# Patient Record
Sex: FEMALE | Race: AMERICAN INDIAN OR ALASKA NATIVE | NOT HISPANIC OR LATINO | Employment: OTHER | URBAN - METROPOLITAN AREA
[De-identification: names, ages, dates, MRNs, and addresses within clinical notes are randomized per-mention and may not be internally consistent; named-entity substitution may affect disease eponyms.]

---

## 2017-12-05 ENCOUNTER — TRANSCRIBE ORDERS (OUTPATIENT)
Dept: ADMINISTRATIVE | Facility: HOSPITAL | Age: 57
End: 2017-12-05

## 2017-12-05 ENCOUNTER — GENERIC CONVERSION - ENCOUNTER (OUTPATIENT)
Dept: OTHER | Facility: OTHER | Age: 57
End: 2017-12-05

## 2017-12-05 ENCOUNTER — HOSPITAL ENCOUNTER (OUTPATIENT)
Dept: RADIOLOGY | Facility: HOSPITAL | Age: 57
Discharge: HOME | End: 2017-12-05
Payer: COMMERCIAL

## 2017-12-05 DIAGNOSIS — J40 BRONCHITIS: ICD-10-CM

## 2017-12-05 DIAGNOSIS — J40 BRONCHITIS: Primary | ICD-10-CM

## 2017-12-05 PROCEDURE — 71020 HB CHEST X-RAY 2VW FRONTAL&LATL: CPT

## 2018-05-10 ENCOUNTER — TRANSCRIBE ORDERS (OUTPATIENT)
Dept: ADMINISTRATIVE | Facility: HOSPITAL | Age: 58
End: 2018-05-10

## 2018-05-10 ENCOUNTER — HOSPITAL ENCOUNTER (OUTPATIENT)
Dept: RADIOLOGY | Facility: HOSPITAL | Age: 58
Discharge: HOME | End: 2018-05-10
Payer: COMMERCIAL

## 2018-05-10 DIAGNOSIS — M25.561 ACUTE PAIN OF RIGHT KNEE: Primary | ICD-10-CM

## 2018-05-10 DIAGNOSIS — M25.561 ACUTE PAIN OF RIGHT KNEE: ICD-10-CM

## 2018-05-10 PROCEDURE — 73564 X-RAY EXAM KNEE 4 OR MORE: CPT

## 2018-05-23 ENCOUNTER — EVALUATION (OUTPATIENT)
Dept: PHYSICAL THERAPY | Facility: CLINIC | Age: 58
End: 2018-05-23
Payer: COMMERCIAL

## 2018-05-23 ENCOUNTER — TRANSCRIBE ORDERS (OUTPATIENT)
Dept: PHYSICAL THERAPY | Facility: CLINIC | Age: 58
End: 2018-05-23

## 2018-05-23 DIAGNOSIS — G89.29 CHRONIC PAIN OF RIGHT KNEE: Primary | ICD-10-CM

## 2018-05-23 DIAGNOSIS — M25.561 PAIN, JOINT, LOWER LEG, RIGHT: Primary | ICD-10-CM

## 2018-05-23 DIAGNOSIS — M25.561 CHRONIC PAIN OF RIGHT KNEE: Primary | ICD-10-CM

## 2018-05-23 PROCEDURE — G8979 MOBILITY GOAL STATUS: HCPCS

## 2018-05-23 PROCEDURE — 97161 PT EVAL LOW COMPLEX 20 MIN: CPT

## 2018-05-23 PROCEDURE — G8978 MOBILITY CURRENT STATUS: HCPCS

## 2018-05-23 PROCEDURE — 97140 MANUAL THERAPY 1/> REGIONS: CPT

## 2018-05-23 RX ORDER — LEVOTHYROXINE AND LIOTHYRONINE 76; 18 UG/1; UG/1
120 TABLET ORAL DAILY
COMMUNITY
End: 2021-01-08 | Stop reason: ALTCHOICE

## 2018-05-23 RX ORDER — LISINOPRIL 5 MG/1
7.5 TABLET ORAL DAILY
COMMUNITY
End: 2020-12-22 | Stop reason: SDUPTHER

## 2018-05-23 RX ORDER — FLUTICASONE FUROATE AND VILANTEROL 100; 25 UG/1; UG/1
POWDER RESPIRATORY (INHALATION)
COMMUNITY
End: 2021-04-05 | Stop reason: ALTCHOICE

## 2018-05-23 NOTE — LETTER
May 23, 2018    [facility]   [address] [place] [place] [address]    Patient: Yoel Raines   YOB: 1960   Date of Visit: 2018     Encounter Diagnosis     ICD-10-CM    1  Chronic pain of right knee M25 561     G89 29        Dear Dr Ayaka Caceres:    Please review the attached Plan of Care from [facility]'s recent visit  Please verify that you agree therapy should continue by signing the attached document and sending it back to our office  If you have any questions or concerns, please don't hesitate to call  Sincerely,    Murlean Cockayne, PT      Referring Provider:      I certify that I have read the below Plan of Care and certify the need for these services furnished under this plan of treatment while under my care  [facility] [address] [place] [address]  VIA Facsimile: [phone]          PT Evaluation     Today's date: 2018  Patient name: Coreen Barahona  : 1960  MRN: [id number]  Referring provider: Joleen North DO  Dx:   Encounter Diagnosis     ICD-10-CM    1  Chronic pain of right knee M25 561     G89 29                   Assessment  Impairments: activity intolerance, impaired physical strength, lacks appropriate home exercise program and pain with function  Functional limitations: difficulty going up/down stairs  Assessment details: Pt is a 61year old female who presents to PT with chronic R knee pain with associated OA  She presents with localized tenderness to medial joint line and PF joint  She has exceptional strength in majority of hip muscualture with exception of  Hip add/abd and ER's  Her LE muscle weakness is apparent during her gait cycle with clear trendelenberg on both sides and outturning of both feet during stride  She has normal knee ROM and LE flexibility   She will benefit from skilled PT to help reduce her pain symptoms, improve her LE strength, improve her gait, and improve her daily functioning to help her return to her previous recreational activities  Thank you kindly for your referral    Understanding of Dx/Px/POC: excellent   Prognosis: good    Goals  STG (3-4 weeks)  1: Reduce pain 2-3 grades on VAS  2: Increase LE strength 1/2 grade  3: improve gait by 25%    LTG (4-6 weeks)  1: pt able to ascend/descend stairs comfortably  2: Improve FOTO 15-20 pts  3: Pt independent in HEP    Plan  Patient would benefit from: skilled PT  Planned modality interventions: ultrasound and cryotherapy  Planned therapy interventions: manual therapy, massage, therapeutic exercise, therapeutic activities, strengthening, balance and home exercise program  Frequency: 2x week  Duration in visits: 12  Duration in weeks: 6  Treatment plan discussed with: patient  Plan details: Initiate PT as per POC        Subjective Evaluation    History of Present Illness  Mechanism of injury: Knee pain started last year- was sitting in hospital rooms while parents were sick  L knee also gives her trouble but not as much as R knee  Wants to get more fit  Injured R knee skiing back in late s- bad sprain  Does sheron chi everyday  Going to try to do exercise sin [employer] pool along with home exercises  Going to nutritionist  Recurrent probem    Quality of life: good    Pain  Current pain ratin  At best pain ratin  At worst pain ratin  Location: medial joint line of R knee  Quality: dull ache and sharp  Relieving factors: medications  Aggravating factors: standing, walking and stair climbing  Progression: worsening    Social Support  Steps to enter house: yes  1  Interior stairs assessed: basement     Lives in: one-story house  Lives with: spouse    Employment status: not working  Exercise history: previously biked, walked, sheron chi      Diagnostic Tests  X-ray: abnormal (bone spurs along PF joint)  Patient Goals  Patient goals for therapy: increased strength, decreased pain, increased motion, return to sport/leisure activities and independence with ADLs/IADLs  Patient goal: want ot get back to being active        Objective     Observations     Additional Observation Details  Gait: trendelenberg BL  Feet outturned, hip ER, mild posterior lean  Tenderness to medial joint line  Mild pain with patellofemoral joint play        Strength/Myotome Testing     Left Hip   Planes of Motion   Flexion: 4+  Abduction: 4-  Adduction: 4-  External rotation: 4  Internal rotation: 4+    Right Hip   Planes of Motion   Flexion: 4+  Abduction: 4  Adduction: 4  External rotation: 4  Internal rotation: 4+    Left Knee   Flexion: 4+  Extension: 5    Right Knee   Flexion: 4+  Extension: 5          Precautions: HTN, hypothyroidism, mild asthma    Daily Treatment Diary     Manual              STM                                                                     Exercise Diary              Recumbent bike             Standing hip ext, abd, flex TB             Heel/toe raises             SL clamshells             Hip add ball squeeze             bridges                                                                                                                                                                                                       Modalities              US to medial joint line             CP PRN                            HEP: SLR 4-way, clamshell, heel/toe raises

## 2018-05-23 NOTE — PROGRESS NOTES
PT Evaluation     Today's date: 2018  Patient name: Sabina Lee  : 1960  MRN: [id number]  Referring provider: Nova Self DO  Dx:   Encounter Diagnosis     ICD-10-CM    1  Chronic pain of right knee M25 561     G89 29                   Assessment  Impairments: activity intolerance, impaired physical strength, lacks appropriate home exercise program and pain with function  Functional limitations: difficulty going up/down stairs  Assessment details: Pt is a 61year old female who presents to PT with chronic R knee pain with associated OA  She presents with localized tenderness to medial joint line and PF joint  She has exceptional strength in majority of hip muscualture with exception of  Hip add/abd and ER's  Her LE muscle weakness is apparent during her gait cycle with clear trendelenberg on both sides and outturning of both feet during stride  She has normal knee ROM and LE flexibility  She will benefit from skilled PT to help reduce her pain symptoms, improve her LE strength, improve her gait, and improve her daily functioning to help her return to her previous recreational activities   Thank you kindly for your referral    Understanding of Dx/Px/POC: excellent   Prognosis: good    Goals  STG (3-4 weeks)  1: Reduce pain 2-3 grades on VAS  2: Increase LE strength 1/2 grade  3: improve gait by 25%    LTG (4-6 weeks)  1: pt able to ascend/descend stairs comfortably  2: Improve FOTO 15-20 pts  3: Pt independent in HEP    Plan  Patient would benefit from: skilled PT  Planned modality interventions: ultrasound and cryotherapy  Planned therapy interventions: manual therapy, massage, therapeutic exercise, therapeutic activities, strengthening, balance and home exercise program  Frequency: 2x week  Duration in visits: 12  Duration in weeks: 6  Treatment plan discussed with: patient  Plan details: Initiate PT as per POC        Subjective Evaluation    History of Present Illness  Mechanism of injury: Knee pain started last year- was sitting in hospital rooms while parents were sick  L knee also gives her trouble but not as much as R knee  Wants to get more fit  Injured R knee skiing back in late s- bad sprain  Does sheron chi everyday  Going to try to do exercise sin [employer] pool along with home exercises  Going to nutritionist  Recurrent probem    Quality of life: good    Pain  Current pain ratin  At best pain ratin  At worst pain ratin  Location: medial joint line of R knee  Quality: dull ache and sharp  Relieving factors: medications  Aggravating factors: standing, walking and stair climbing  Progression: worsening    Social Support  Steps to enter house: yes  1  Interior stairs assessed: basement     Lives in: [place] house  Lives with: spouse    Employment status: not working  Exercise history: previously biked, walked, sheron chi      Diagnostic Tests  X-ray: abnormal (bone spurs along PF joint)  Patient Goals  Patient goals for therapy: increased strength, decreased pain, increased motion, return to sport/leisure activities and independence with ADLs/IADLs  Patient goal: want ot get back to being active        Objective     Observations     Additional Observation Details  Gait: trendelenberg BL  Feet outturned, hip ER, mild posterior lean  Tenderness to medial joint line  Mild pain with patellofemoral joint play        Strength/Myotome Testing     Left Hip   Planes of Motion   Flexion: 4+  Abduction: 4-  Adduction: 4-  External rotation: 4  Internal rotation: 4+    Right Hip   Planes of Motion   Flexion: 4+  Abduction: 4  Adduction: 4  External rotation: 4  Internal rotation: 4+    Left Knee   Flexion: 4+  Extension: 5    Right Knee   Flexion: 4+  Extension: 5          Precautions: HTN, hypothyroidism, mild asthma    Daily Treatment Diary     Manual              STM                                                                     Exercise Diary              Recumbent bike Standing hip ext, abd, flex TB             Heel/toe raises             SL clamshells             Hip add ball squeeze             bridges                                                                                                                                                                                                       Modalities              US to medial joint line             CP PRN                            HEP: SLR 4-way, clamshell, heel/toe raises

## 2018-05-30 ENCOUNTER — OFFICE VISIT (OUTPATIENT)
Dept: PHYSICAL THERAPY | Facility: CLINIC | Age: 58
End: 2018-05-30
Payer: COMMERCIAL

## 2018-05-30 DIAGNOSIS — M25.561 CHRONIC PAIN OF RIGHT KNEE: Primary | ICD-10-CM

## 2018-05-30 DIAGNOSIS — G89.29 CHRONIC PAIN OF RIGHT KNEE: Primary | ICD-10-CM

## 2018-05-30 PROCEDURE — 97035 APP MDLTY 1+ULTRASOUND EA 15: CPT

## 2018-05-30 PROCEDURE — 97112 NEUROMUSCULAR REEDUCATION: CPT

## 2018-05-30 PROCEDURE — 97110 THERAPEUTIC EXERCISES: CPT

## 2018-05-30 NOTE — PROGRESS NOTES
Daily Note     Today's date: 2018  Patient name: Kizzy Gallo  : 1960  MRN: [id number]  Referring provider: Flora Ward DO  Dx:   Encounter Diagnosis     ICD-10-CM    1  Chronic pain of right knee M25 561     G89 29                   Subjective: Feeling okay, Exercises look a lot easier on the paper, but they are working out well  Objective: See treatment diary below      Assessment: Tolerated treatment well  Patient exhibited good technique with therapeutic exercises and would benefit from continued PT  Pt had a mild pain in both patellas during SAQ exercise but was able to complete  Pt reported no pain at the end of session, held CP  Continue to add more leg strengthening NV  Plan: Continue per plan of care       Precautions: HTN, hypothyroidism, mild asthma    Daily Treatment Diary     Manual              STM 5                                                                    Exercise Diary              Recumbent bike 5 min            Standing hip ext, abd, flex TB 20x GTB/OTB            Heel/toe raises 30x            SL clamshells GTB 20x5" each            Hip add ball squeeze 10x10"            bridges 15x5"            SAQ 5# 2x10                                                                                                                                                                         15/10                Modalities              US to medial joint line 8            CP PRN NP                           HEP: SLR 4-way, clamshell, heel/toe raises

## 2018-06-04 ENCOUNTER — OFFICE VISIT (OUTPATIENT)
Dept: PHYSICAL THERAPY | Facility: CLINIC | Age: 58
End: 2018-06-04
Payer: COMMERCIAL

## 2018-06-04 DIAGNOSIS — M25.561 CHRONIC PAIN OF RIGHT KNEE: Primary | ICD-10-CM

## 2018-06-04 DIAGNOSIS — G89.29 CHRONIC PAIN OF RIGHT KNEE: Primary | ICD-10-CM

## 2018-06-04 PROCEDURE — 97110 THERAPEUTIC EXERCISES: CPT

## 2018-06-04 PROCEDURE — 97112 NEUROMUSCULAR REEDUCATION: CPT

## 2018-06-04 PROCEDURE — 97140 MANUAL THERAPY 1/> REGIONS: CPT

## 2018-06-04 PROCEDURE — 97035 APP MDLTY 1+ULTRASOUND EA 15: CPT

## 2018-06-04 NOTE — PROGRESS NOTES
Daily Note     Today's date: 2018  Patient name: Maday Correia  : 1960  MRN: [id number]  Referring provider: Deann Hope DO  Dx:   Encounter Diagnosis     ICD-10-CM    1  Chronic pain of right knee M25 561     G89 29                   Subjective: R knee is feeling okay today pain 1-2/10 currently  Felt good after last session was just tired  I can tell the exercises are getting me stronger  Objective: See treatment diary below      Assessment: Tolerated treatment well  Patient exhibited good technique with therapeutic exercises and would benefit from continued PT  Pt had some fatigue after standing TB exercises  Pt requires some endurance training, mild SOB symptoms after strenuous exercises  No complaints of pain after session  Plan: Continue per plan of care       Precautions: HTN, hypothyroidism, mild asthma    Daily Treatment Diary     Manual             STM 5            Knee flex stretch/gastrocs stretch  10                                       10               Exercise Diary             Recumbent bike 5 min 5 min           Standing hip ext, abd, flex TB 20x GTB/OTB 20x GTB/OTB           Heel/toe raises 30x 30x           SL clamshells GTB 20x5" each GTB 20x5"           Hip add ball squeeze 10x10" 10x10"           bridges 15x5" 15x5"           SAQ 5# 2x10 5# 2x10           Lateral step-up L2  15x each                                                                                                                                                           15/10 15/10               Modalities             US to medial joint line 8 8           CP PRN NP                           HEP: SLR 4-way, clamshell, heel/toe raises

## 2018-06-07 ENCOUNTER — OFFICE VISIT (OUTPATIENT)
Dept: PHYSICAL THERAPY | Facility: CLINIC | Age: 58
End: 2018-06-07
Payer: COMMERCIAL

## 2018-06-07 DIAGNOSIS — G89.29 CHRONIC PAIN OF RIGHT KNEE: Primary | ICD-10-CM

## 2018-06-07 DIAGNOSIS — M25.561 CHRONIC PAIN OF RIGHT KNEE: Primary | ICD-10-CM

## 2018-06-07 PROCEDURE — 97035 APP MDLTY 1+ULTRASOUND EA 15: CPT

## 2018-06-07 PROCEDURE — 97140 MANUAL THERAPY 1/> REGIONS: CPT

## 2018-06-07 PROCEDURE — 97110 THERAPEUTIC EXERCISES: CPT

## 2018-06-07 PROCEDURE — 97112 NEUROMUSCULAR REEDUCATION: CPT

## 2018-06-07 NOTE — PROGRESS NOTES
Daily Note     Today's date: 2018  Patient name: Gwen Moore  : 1960  MRN: [id number]  Referring provider: Melany Garcia DO  Dx:   Encounter Diagnosis     ICD-10-CM    1  Chronic pain of right knee M25 561     G89 29                   Subjective: R knee is feeling pretty good today  Picked 18 lbs of strawberries yesterday and I was feeling pretty good all day  Pt reports she is up to 3x10 for exercises  Her  noted that she struggled in the beginning with exercises now she is walking better with less limp and better endurance  Objective: See treatment diary below      Assessment: Tolerated treatment well  Patient exhibited good technique with therapeutic exercises and would benefit from continued PT Added more balance and stabilization exercises for R knee, pt tolerated well, mild pain with lateral lunge to balance pad but tolerable  No complains of pain after session  Plan: Continue per plan of care       Precautions: HTN, hypothyroidism, mild asthma    Daily Treatment Diary     Manual            STM 5            Knee flex stretch/gastrocs stretch  10 10                                      10 10              Exercise Diary            Recumbent bike 5 min 5 min 5 min          Standing hip ext, abd, flex TB 20x GTB/OTB 20x GTB/OTB 20x GTB          Heel/toe raises 30x 30x 30x          SL clamshells GTB 20x5" each GTB 20x5" BTB 20x5"          Hip add ball squeeze 10x10" 10x10" 10x10"            bridges 15x5" 15x5" 15x5"          SAQ 5# 2x10 5# 2x10 5# 2x10          Lateral step-up L2  15x each L3 15x          Bosu ball squats   15x          Forward/lateral lunges on balance pad   15x each          Lateral walking with TB   GTB 4 laps                                                                                                                   15/10 15/10 15/15              Modalities            US to medial joint line 8 8 8          CP PRN NP                           HEP: SLR 4-way, clamshell, heel/toe raises

## 2018-06-11 ENCOUNTER — OFFICE VISIT (OUTPATIENT)
Dept: PHYSICAL THERAPY | Facility: CLINIC | Age: 58
End: 2018-06-11
Payer: COMMERCIAL

## 2018-06-11 DIAGNOSIS — G89.29 CHRONIC PAIN OF RIGHT KNEE: Primary | ICD-10-CM

## 2018-06-11 DIAGNOSIS — M25.561 CHRONIC PAIN OF RIGHT KNEE: Primary | ICD-10-CM

## 2018-06-11 PROCEDURE — 97110 THERAPEUTIC EXERCISES: CPT

## 2018-06-11 PROCEDURE — 97140 MANUAL THERAPY 1/> REGIONS: CPT

## 2018-06-11 PROCEDURE — 97112 NEUROMUSCULAR REEDUCATION: CPT

## 2018-06-11 PROCEDURE — 97035 APP MDLTY 1+ULTRASOUND EA 15: CPT

## 2018-06-11 NOTE — PROGRESS NOTES
Daily Note     Today's date: 2018  Patient name: Sandee Fan  : 1960  MRN: [id number]  Referring provider: Denis Girard DO  Dx:   Encounter Diagnosis     ICD-10-CM    1  Chronic pain of right knee M25 561     G89 29                   Subjective: Pt reports feeling sore today, Did a lot of yardwork outside over weekend  Last visit knee felt really good just tired  Objective: See treatment diary below      Assessment: Tolerated treatment well  Patient exhibited good technique with therapeutic exercises  Pt was able to tolerate TE 's today with only mild soreness, specifically with step-up  Pt's symptoms seem  to be gradually improving  Plan: Continue per plan of care       Precautions: HTN, hypothyroidism, mild asthma    Daily Treatment Diary     Manual           STM 5            Knee flex stretch/gastrocs stretch  10 10 10                                     10 10 10             Exercise Diary           Recumbent bike 5 min 5 min 5 min 5 min         Standing hip ext, abd, flex TB 20x GTB/OTB 20x GTB/OTB 20x GTB 20x BTB/GTB         Heel/toe raises 30x 30x 30x 30x         SL clamshells GTB 20x5" each GTB 20x5" BTB 20x5" BTB 20x5"         Hip add ball squeeze 10x10" 10x10" 10x10"   10x10"         bridges 15x5" 15x5" 15x5" 15x5"         SAQ 5# 2x10 5# 2x10 5# 2x10 5# 20x5"         Lateral step-up L2  15x each L3 15x 15x each         Bosu ball squats   15x 15x         Forward/lateral lunges on balance pad   15x each 15x each         Lateral walking with TB   GTB 4 laps GTB 4 laps                                                                                                                  15/10 15/10 15/15 15/10             Modalities           US to medial joint line 8 8 8 8         CP PRN NP                           HEP: SLR 4-way, clamshell, heel/toe raises

## 2018-06-14 ENCOUNTER — OFFICE VISIT (OUTPATIENT)
Dept: PHYSICAL THERAPY | Facility: CLINIC | Age: 58
End: 2018-06-14
Payer: COMMERCIAL

## 2018-06-14 DIAGNOSIS — G89.29 CHRONIC PAIN OF RIGHT KNEE: Primary | ICD-10-CM

## 2018-06-14 DIAGNOSIS — M25.561 CHRONIC PAIN OF RIGHT KNEE: Primary | ICD-10-CM

## 2018-06-14 PROCEDURE — 97140 MANUAL THERAPY 1/> REGIONS: CPT

## 2018-06-14 PROCEDURE — 97110 THERAPEUTIC EXERCISES: CPT

## 2018-06-14 PROCEDURE — 97112 NEUROMUSCULAR REEDUCATION: CPT

## 2018-06-14 PROCEDURE — 97035 APP MDLTY 1+ULTRASOUND EA 15: CPT

## 2018-06-14 NOTE — PROGRESS NOTES
Daily Note     Today's date: 2018  Patient name: Lary Rosenberg  : 1960  MRN: [id number]  Referring provider: Romero Holliday DO  Dx:   Encounter Diagnosis     ICD-10-CM    1  Chronic pain of right knee M25 561     G89 29                   Subjective: R knee is feeling a lot better, just got bench for gardening which will help me rise up from kneeling, which I think will really help especially if I'm doing it several times a day  Objective: See treatment diary below      Assessment: Tolerated treatment well  Patient exhibited good technique with therapeutic exercises and would benefit from continued PT  Pt had mild fatigue at end of session especially when performing bridges and ball hip add exercise together  At the end of the session she reported she felt good just tired  Pt seems to be responding well to therapy  Plan: Continue per plan of care       Precautions: HTN, hypothyroidism, mild asthma    Daily Treatment Diary     Manual          STM 5            Knee flex stretch/gastrocs stretch  10 10 10 10                                    10 10 10 10            Exercise Diary          Recumbent bike 5 min 5 min 5 min 5 min 5 min        Standing hip ext, abd, flex TB 20x GTB/OTB 20x GTB/OTB 20x GTB 20x BTB/GTB 20x each        Heel/toe raises 30x 30x 30x 30x 30x        SL clamshells GTB 20x5" each GTB 20x5" BTB 20x5" BTB 20x5" BTB 20x5"        Hip add ball squeeze 10x10" 10x10" 10x10"   10x10" 10x10" with bridge        bridges 15x5" 15x5" 15x5" 15x5" ----        SAQ 5# 2x10 5# 2x10 5# 2x10 5# 20x5" 5# 20x5"        Lateral step-up L2  15x each L3 15x 15x each 15x each        Bosu ball squats   15x 15x 15x        Forward/lateral lunges on balance pad   15x each 15x each 15x each        Lateral walking with TB   GTB 4 laps GTB 4 laps GTB 4 laps 15/10 15/10 15/15 15/10 15/10            Modalities  5/30 6/4 6/7 6/11 6/14        US to medial joint line 8 8 8 8 8        CP PRN NP                           HEP: SLR 4-way, clamshell, heel/toe raises

## 2018-06-18 ENCOUNTER — OFFICE VISIT (OUTPATIENT)
Dept: PHYSICAL THERAPY | Facility: CLINIC | Age: 58
End: 2018-06-18
Payer: COMMERCIAL

## 2018-06-18 DIAGNOSIS — G89.29 CHRONIC PAIN OF RIGHT KNEE: Primary | ICD-10-CM

## 2018-06-18 DIAGNOSIS — M25.561 CHRONIC PAIN OF RIGHT KNEE: Primary | ICD-10-CM

## 2018-06-18 PROCEDURE — G8979 MOBILITY GOAL STATUS: HCPCS

## 2018-06-18 PROCEDURE — 97140 MANUAL THERAPY 1/> REGIONS: CPT

## 2018-06-18 PROCEDURE — 97035 APP MDLTY 1+ULTRASOUND EA 15: CPT

## 2018-06-18 PROCEDURE — 97112 NEUROMUSCULAR REEDUCATION: CPT

## 2018-06-18 PROCEDURE — 97110 THERAPEUTIC EXERCISES: CPT

## 2018-06-18 PROCEDURE — G8978 MOBILITY CURRENT STATUS: HCPCS

## 2018-06-18 NOTE — PROGRESS NOTES
PT Re-Evaluation     Today's date: 2018  Patient name: Laith Calderon  : 1960  MRN: [id number]  Referring provider: Chino Jon DO  Dx:   Encounter Diagnosis     ICD-10-CM    1  Chronic pain of right knee M25 561     G89 29                   Assessment  Impairments: activity intolerance, impaired physical strength and pain with function    Assessment details: Pt has been progressing well with therapy  She has improved symptoms, decreased R knee pain, improved tolerance to activity, increased strength and improved gait  She still has occasional pain with ascending/descending stairs and pain in R knee with kneeling during gardening  We would like to work further to help improve her symptoms and better her overall functioning  Recommend another 3-4 weeks of therapy  Understanding of Dx/Px/POC: excellent   Prognosis: good    Goals  STG (3-4 weeks)  1: Reduce pain 2-3 grades on VAS- met  2:  Increase LE strength 1/2 grade- met  3: improve gait by 25%- met    LTG (4-6 weeks)  1: pt able to ascend/descend stairs comfortably- partially met  2: Improve FOTO 15-20 pts- not met  3: Pt independent in HEP- met    Plan  Patient would benefit from: skilled PT  Planned modality interventions: ultrasound and cryotherapy  Planned therapy interventions: manual therapy, massage, therapeutic exercise, therapeutic activities, strengthening, balance and home exercise program  Frequency: 2x week  Duration in visits: 8  Duration in weeks: 4  Treatment plan discussed with: patient  Plan details: Continue with PT as per POC        Subjective Evaluation    History of Present Illness  Mechanism of injury: Pt reports improved symptoms in R knee, able to do more in her garden, She feels good after therapy  Has been using a kneeling bench to help her get up and down she feels like she is cheating but it should be help reduce stress on her R knee  Able to go down stairs easier      Recurrent probem    Quality of life: good    Pain  Current pain ratin  At best pain ratin  At worst pain ratin  Location: medial joint line of R knee  Quality: dull ache and sharp  Relieving factors: medications  Aggravating factors: standing, walking and stair climbing  Progression: worsening    Social Support  Steps to enter house: yes  1  Interior stairs assessed: basement     Lives in: one-story house  Lives with: spouse    Employment status: not working  Exercise history: previously biked, walked, sheron chi      Diagnostic Tests  X-ray: abnormal (bone spurs along PF joint)  Patient Goals  Patient goals for therapy: increased strength, decreased pain, increased motion, return to sport/leisure activities and independence with ADLs/IADLs  Patient goal: wants to get back to being active        Objective     Observations     Additional Observation Details  Gait: trendelenberg BL  Feet outturned, hip ER, mild posterior lean  Tenderness to medial joint line  Mild pain with patellofemoral joint play        Strength/Myotome Testing     Left Hip   Planes of Motion   Flexion: 4+  Abduction: 4+  Adduction: 4+  External rotation: 4  Internal rotation: 4+    Right Hip   Planes of Motion   Flexion: 4+  Abduction: 4  Adduction: 4  External rotation: 4  Internal rotation: 4+    Left Knee   Flexion: 4+  Extension: 5    Right Knee   Flexion: 4+  Extension: 5          Precautions: HTN, hypothyroidism, mild asthma    Daily Treatment Diary     Manual         STM 5            Knee flex stretch/gastrocs stretch  10 10 10 10 10                                   10 10 10 10 10           Exercise Diary         Recumbent bike 5 min 5 min 5 min 5 min 5 min 5 min       Standing hip ext, abd, flex TB 20x GTB/OTB 20x GTB/OTB 20x GTB 20x BTB/GTB 20x each Ext MTB/ abd BTB       Heel/toe raises 30x 30x 30x 30x 30x 30x       SL clamshells GTB 20x5" each GTB 20x5" BTB 20x5" BTB 20x5" BTB 20x5" NP       Hip add ball squeeze 10x10" 10x10" 10x10"   10x10" 10x10" with bridge 10x10" with bridge       bridges 15x5" 15x5" 15x5" 15x5" ----        SAQ 5# 2x10 5# 2x10 5# 2x10 5# 20x5" 5# 20x5" NP       Lateral step-up L2  15x each L3 15x 15x each 15x each 15x each L3       Bosu ball squats   15x 15x 15x 20x       Forward/lateral lunges on balance pad   15x each 15x each 15x each 15x each       Lateral walking with TB   GTB 4 laps GTB 4 laps GTB 4 laps BTB 4 laps                                                                                                                15/10 15/10 15/15 15/10 15/10 10/10           Modalities  5/30 6/4 6/7 6/11 6/14 6/18       US to medial joint line 8 8 8 8 8 8       CP PRN NP                           HEP: SLR 4-way, clamshell, heel/toe raises

## 2018-06-21 ENCOUNTER — OFFICE VISIT (OUTPATIENT)
Dept: PHYSICAL THERAPY | Facility: CLINIC | Age: 58
End: 2018-06-21
Payer: COMMERCIAL

## 2018-06-21 DIAGNOSIS — M25.561 CHRONIC PAIN OF RIGHT KNEE: Primary | ICD-10-CM

## 2018-06-21 DIAGNOSIS — G89.29 CHRONIC PAIN OF RIGHT KNEE: Primary | ICD-10-CM

## 2018-06-21 PROCEDURE — 97110 THERAPEUTIC EXERCISES: CPT

## 2018-06-21 PROCEDURE — 97140 MANUAL THERAPY 1/> REGIONS: CPT

## 2018-06-21 PROCEDURE — 97112 NEUROMUSCULAR REEDUCATION: CPT

## 2018-06-21 NOTE — PROGRESS NOTES
Daily Note     Today's date: 2018  Patient name: Brandy Paz  : 1960  MRN: [id number]  Referring provider: Jasbir Aranda DO  Dx:   Encounter Diagnosis     ICD-10-CM    1  Chronic pain of right knee M25 561     G89 29                   Subjective: R knee is feeling much better  Was able to go up and down stairs in an old house yesterday without problems  Knee is feeling little achey today with the bad weather but not too bad  Objective: See treatment diary below      Assessment: Tolerated treatment well  Patient exhibited good technique with therapeutic exercises  Held US due to Pts improved symptoms  Pt was SOB due to humidity in air but otherwise was able to complete exercises with no pain or fatigue  Plan: Continue per plan of care       Precautions: HTN, hypothyroidism, mild asthma    Daily Treatment Diary     Manual        STM 5            Knee flex stretch/gastrocs stretch  10 10 10 10 10 10                                  10 10 10 10 10 10          Exercise Diary        Recumbent bike 5 min 5 min 5 min 5 min 5 min 5 min 5 min      Standing hip ext, abd, flex TB 20x GTB/OTB 20x GTB/OTB 20x GTB 20x BTB/GTB 20x each Ext MTB/ abd BTB 20x each Ext MTB/ abd BTB 20x each      Heel/toe raises 30x 30x 30x 30x 30x 30x 30x      SL clamshells GTB 20x5" each GTB 20x5" BTB 20x5" BTB 20x5" BTB 20x5" NP BTB 20x5"      Hip add ball squeeze 10x10" 10x10" 10x10"   10x10" 10x10" with bridge 10x10" with bridge 10x10" with bridge      bridges 15x5" 15x5" 15x5" 15x5" ----        SAQ 5# 2x10 5# 2x10 5# 2x10 5# 20x5" 5# 20x5" NP 5# 20x5"      Lateral step-up L2  15x each L3 15x 15x each 15x each 15x each L3 15x each L3      Bosu ball squats   15x 15x 15x 20x 20x      Forward/lateral lunges on balance pad   15x each 15x each 15x each 15x each 15x each      Lateral walking with TB   GTB 4 laps GTB 4 laps GTB 4 laps BTB 4 laps BTB 4 laps                                                                                                               15/10 15/10 15/15 15/10 15/10 10/10 15/15          Modalities  5/30 6/4 6/7 6/11 6/14 6/18 6/21      US to medial joint line 8 8 8 8 8 8 NP      CP PRN NP                           HEP: SLR 4-way, clamshell, heel/toe raises

## 2018-06-25 ENCOUNTER — OFFICE VISIT (OUTPATIENT)
Dept: PHYSICAL THERAPY | Facility: CLINIC | Age: 58
End: 2018-06-25
Payer: COMMERCIAL

## 2018-06-25 DIAGNOSIS — G89.29 CHRONIC PAIN OF RIGHT KNEE: Primary | ICD-10-CM

## 2018-06-25 DIAGNOSIS — M25.561 CHRONIC PAIN OF RIGHT KNEE: Primary | ICD-10-CM

## 2018-06-25 PROCEDURE — 97035 APP MDLTY 1+ULTRASOUND EA 15: CPT

## 2018-06-25 PROCEDURE — 97140 MANUAL THERAPY 1/> REGIONS: CPT

## 2018-06-25 PROCEDURE — 97110 THERAPEUTIC EXERCISES: CPT

## 2018-06-25 PROCEDURE — 97112 NEUROMUSCULAR REEDUCATION: CPT

## 2018-06-25 NOTE — PROGRESS NOTES
Daily Note     Today's date: 2018  Patient name: Brandy Paz  : 1960  MRN: [id number]  Referring provider: Jasbir Aranda DO  Dx:   Encounter Diagnosis     ICD-10-CM    1  Chronic pain of right knee M25 561     G89 29                   Subjective: R knee has been feeling good overall, yet it woke me up this morning with a lot of pain, I don't know why  Pain felt sharp  It has calmed down since then  Objective: See treatment diary below      Assessment: Tolerated treatment well  Patient exhibited good technique with therapeutic exercises and would benefit from continued PT  Readded US to treatment to reduce sharp pain symptoms  Pt able to complete TE's without increasing pain in R knee  No complaints after session, no CP      Plan: Continue per plan of care     Precautions: HTN, hypothyroidism, mild asthma    Daily Treatment Diary     Manual       STM 5            Knee flex stretch/gastrocs stretch  10 10 10 10 10 10 10                                 10 10 10 10 10 10 10         Exercise Diary       Recumbent bike 5 min 5 min 5 min 5 min 5 min 5 min 5 min 5 min     Standing hip ext, abd, flex TB 20x GTB/OTB 20x GTB/OTB 20x GTB 20x BTB/GTB 20x each Ext MTB/ abd BTB 20x each Ext MTB/ abd BTB 20x each Ext MTB/abd BTB 20x each     Heel/toe raises 30x 30x 30x 30x 30x 30x 30x 30x     SL clamshells GTB 20x5" each GTB 20x5" BTB 20x5" BTB 20x5" BTB 20x5" NP BTB 20x5" MTB 20x5"     Hip add ball squeeze 10x10" 10x10" 10x10"   10x10" 10x10" with bridge 10x10" with bridge 10x10" with bridge 10x10" with bridge     bridges 15x5" 15x5" 15x5" 15x5" ----        SAQ 5# 2x10 5# 2x10 5# 2x10 5# 20x5" 5# 20x5" NP 5# 20x5" 5# 20x5"     Lateral step-up L2  15x each L3 15x 15x each 15x each 15x each L3 15x each L3 15x each L3     Bosu ball squats   15x 15x 15x 20x 20x 20x     Forward/lateral lunges on balance pad   15x each 15x each 15x each 15x each 15x each 15x each     Lateral walking with TB   GTB 4 laps GTB 4 laps GTB 4 laps BTB 4 laps BTB 4 laps BTB 4 laps                                                                                                              15/10 15/10 15/15 15/10 15/10 10/10 15/15 15/15         Modalities  5/30 6/4 6/7 6/11 6/14 6/18 6/21 6/25     US to medial joint line 8 8 8 8 8 8 NP 8     CP PRN NP                           HEP: SLR 4-way, clamshell, heel/toe raises

## 2018-06-28 ENCOUNTER — APPOINTMENT (OUTPATIENT)
Dept: PHYSICAL THERAPY | Facility: CLINIC | Age: 58
End: 2018-06-28
Payer: COMMERCIAL

## 2018-07-02 ENCOUNTER — OFFICE VISIT (OUTPATIENT)
Dept: PHYSICAL THERAPY | Facility: CLINIC | Age: 58
End: 2018-07-02
Payer: COMMERCIAL

## 2018-07-02 DIAGNOSIS — G89.29 CHRONIC PAIN OF RIGHT KNEE: Primary | ICD-10-CM

## 2018-07-02 DIAGNOSIS — M25.561 CHRONIC PAIN OF RIGHT KNEE: Primary | ICD-10-CM

## 2018-07-02 PROCEDURE — 97140 MANUAL THERAPY 1/> REGIONS: CPT

## 2018-07-02 PROCEDURE — 97010 HOT OR COLD PACKS THERAPY: CPT

## 2018-07-02 PROCEDURE — 97112 NEUROMUSCULAR REEDUCATION: CPT

## 2018-07-02 PROCEDURE — 97110 THERAPEUTIC EXERCISES: CPT

## 2018-07-05 ENCOUNTER — OFFICE VISIT (OUTPATIENT)
Dept: PHYSICAL THERAPY | Facility: CLINIC | Age: 58
End: 2018-07-05
Payer: COMMERCIAL

## 2018-07-05 DIAGNOSIS — G89.29 CHRONIC PAIN OF RIGHT KNEE: Primary | ICD-10-CM

## 2018-07-05 DIAGNOSIS — M25.561 CHRONIC PAIN OF RIGHT KNEE: Primary | ICD-10-CM

## 2018-07-05 PROCEDURE — G8980 MOBILITY D/C STATUS: HCPCS

## 2018-07-05 PROCEDURE — 97110 THERAPEUTIC EXERCISES: CPT

## 2018-07-05 PROCEDURE — 97112 NEUROMUSCULAR REEDUCATION: CPT

## 2018-07-05 PROCEDURE — G8979 MOBILITY GOAL STATUS: HCPCS

## 2018-07-05 PROCEDURE — 97140 MANUAL THERAPY 1/> REGIONS: CPT

## 2018-07-05 NOTE — PROGRESS NOTES
Daily Note     Today's date: 2018  Patient name: Gera Bergman  : 1960  MRN: [id number]  Referring provider: Drema Osgood, DO  Dx:   Encounter Diagnosis     ICD-10-CM    1  Chronic pain of right knee M25 561     G89 29                   Subjective: Pt reports she was able to step up on a curb without any problem, which she was not able to do before  Pt feels she is ready to be D/C from therapy and continue exercises with fitness program       Objective: See treatment diary below      Assessment: Tolerated treatment well  Patient exhibited good technique with therapeutic exercises  Pt is able to perform her exercises independently  She reports significant reduction in pain with activity, still has occasional pain with bad weather  Pt has achieved her therapy goals, ready for D/C  Goals  STG (3-4 weeks)  1: Reduce pain 2-3 grades on VAS- met  2:  Increase LE strength 1/2 grade- met  3: improve gait by 25%- met    LTG (4-6 weeks)  1: pt able to ascend/descend stairs comfortably- met  2: Improve FOTO 15-20 pts- met  3: Pt independent in HEP- met    Plan: D/C to fitness/HEP    Precautions: HTN, hypothyroidism, mild asthma    Daily Treatment Diary     Manual   6 7/2 7/5   STM 5            Knee flex stretch/gastrocs stretch  10 10 10 10 10 10 10 10 10                               10 10 10 10 10 10 10 10 10       Exercise Diary   6 7/2 7/5   Recumbent bike 5 min 5 min 5 min 5 min 5 min 5 min 5 min 5 min 5 min 5 min L3   Standing hip ext, abd, flex TB 20x GTB/OTB 20x GTB/OTB 20x GTB 20x BTB/GTB 20x each Ext MTB/ abd BTB 20x each Ext MTB/ abd BTB 20x each Ext MTB/abd BTB 20x each 30x each    Heel/toe raises 30x 30x 30x 30x 30x 30x 30x 30x 30x 30x   SL clamshells GTB 20x5" each GTB 20x5" BTB 20x5" BTB 20x5" BTB 20x5" NP BTB 20x5" MTB 20x5" MTB 20x5"    Hip add ball squeeze 10x10" 10x10" 10x10"   10x10" 10x10" with bridge 10x10" with bridge 10x10" with bridge 10x10" with bridge 10x10" with bridge 10x10" with bridge   bridges 15x5" 15x5" 15x5" 15x5" ----        SAQ 5# 2x10 5# 2x10 5# 2x10 5# 20x5" 5# 20x5" NP 5# 20x5" 5# 20x5" 5# 20x5"    Lateral step-up L2  15x each L3 15x 15x each 15x each 15x each L3 15x each L3 15x each L3 15x each L3 15x each L3   Bosu ball squats   15x 15x 15x 20x 20x 20x 20x 20x   Forward/lateral lunges on balance pad   15x each 15x each 15x each 15x each 15x each 15x each 15x each 15x each   Lateral walking with TB   GTB 4 laps GTB 4 laps GTB 4 laps BTB 4 laps BTB 4 laps BTB 4 laps MTB 4 laps MTB 4 laps                                                                                                            15/10 15/10 15/15 15/10 15/10 10/10 15/15 15/15 15/10 10/10       Modalities  5/30 6/4 6/7 6/11 6/14 6/18 6/21 6/25 7/2 7/5   US to medial joint line 8 8 8 8 8 8 NP 8     CP PRN NP        10             10      HEP: SLR 4-way, clamshell, heel/toe raises

## 2018-07-09 ENCOUNTER — OFFICE VISIT (OUTPATIENT)
Dept: PHYSICAL THERAPY | Facility: CLINIC | Age: 58
End: 2018-07-09
Payer: COMMERCIAL

## 2018-07-12 ENCOUNTER — APPOINTMENT (OUTPATIENT)
Dept: PHYSICAL THERAPY | Facility: CLINIC | Age: 58
End: 2018-07-12
Payer: COMMERCIAL

## 2018-07-16 ENCOUNTER — APPOINTMENT (OUTPATIENT)
Dept: PHYSICAL THERAPY | Facility: CLINIC | Age: 58
End: 2018-07-16
Payer: COMMERCIAL

## 2019-12-31 NOTE — PROGRESS NOTES
----- Message from Je Michelle MD sent at 12/27/2019  7:08 PM CST -----  Please notify patient with result.  Gastritis, neg HP. PPI. Gastritis and healing of previously described gastric ulcer     Daily Note     Today's date: 2018  Patient name: Rush Edwards  : 1960  MRN: [id number]  Referring provider: Erika Chou DO  Dx:   Encounter Diagnosis     ICD-10-CM    1  Chronic pain of right knee M25 561     G89 29                   Subjective: Was able to go down basement steps which she has been avoiding for awhile  Had some trouble carrying laundry up the stairs, brought it up one step at a time  Saw her nutritionist, found out she lost 50 pounds since the beginning of the year  Objective: See treatment diary below      Assessment: Tolerated treatment well  Patient exhibited good technique with therapeutic exercises  Pt reported soreness after session  PC for 10 min after TE's  May have to re-add US NV  Plan: Continue per plan of care       Precautions: HTN, hypothyroidism, mild asthma    Daily Treatment Diary     Manual   7/2    STM 5            Knee flex stretch/gastrocs stretch  10 10 10 10 10 10 10 10                                10 10 10 10 10 10 10 10        Exercise Diary   7/2    Recumbent bike 5 min 5 min 5 min 5 min 5 min 5 min 5 min 5 min 5 min    Standing hip ext, abd, flex TB 20x GTB/OTB 20x GTB/OTB 20x GTB 20x BTB/GTB 20x each Ext MTB/ abd BTB 20x each Ext MTB/ abd BTB 20x each Ext MTB/abd BTB 20x each 30x each    Heel/toe raises 30x 30x 30x 30x 30x 30x 30x 30x 30x    SL clamshells GTB 20x5" each GTB 20x5" BTB 20x5" BTB 20x5" BTB 20x5" NP BTB 20x5" MTB 20x5" MTB 20x5"    Hip add ball squeeze 10x10" 10x10" 10x10"   10x10" 10x10" with bridge 10x10" with bridge 10x10" with bridge 10x10" with bridge 10x10" with bridge    bridges 15x5" 15x5" 15x5" 15x5" ----        SAQ 5# 2x10 5# 2x10 5# 2x10 5# 20x5" 5# 20x5" NP 5# 20x5" 5# 20x5" 5# 20x5"    Lateral step-up L2  15x each L3 15x 15x each 15x each 15x each L3 15x each L3 15x each L3 15x each L3    Bosu ball squats   15x 15x 15x 20x 20x 20x 20x Forward/lateral lunges on balance pad   15x each 15x each 15x each 15x each 15x each 15x each 15x each    Lateral walking with TB   GTB 4 laps GTB 4 laps GTB 4 laps BTB 4 laps BTB 4 laps BTB 4 laps MTB 4 laps                                                                                                             15/10 15/10 15/15 15/10 15/10 10/10 15/15 15/15 15/10        Modalities  5/30 6/4 6/7 6/11 6/14 6/18 6/21 6/25 7/2    US to medial joint line 8 8 8 8 8 8 NP 8     CP PRN NP        10             10      HEP: SLR 4-way, clamshell, heel/toe raises

## 2020-09-24 ENCOUNTER — EVALUATION (OUTPATIENT)
Dept: PHYSICAL THERAPY | Facility: CLINIC | Age: 60
End: 2020-09-24
Payer: COMMERCIAL

## 2020-09-24 ENCOUNTER — TRANSCRIBE ORDERS (OUTPATIENT)
Dept: PHYSICAL THERAPY | Facility: CLINIC | Age: 60
End: 2020-09-24

## 2020-09-24 DIAGNOSIS — M25.571 RIGHT ANKLE PAIN, UNSPECIFIED CHRONICITY: ICD-10-CM

## 2020-09-24 DIAGNOSIS — Z47.89 ENCOUNTER FOR OTHER ORTHOPEDIC AFTERCARE: Primary | ICD-10-CM

## 2020-09-24 PROCEDURE — 97162 PT EVAL MOD COMPLEX 30 MIN: CPT | Performed by: PHYSICAL THERAPIST

## 2020-09-24 NOTE — PROGRESS NOTES
PT Evaluation     Today's date: 2020  Patient name: Dangelo Singh  : 1960  MRN: [id number]  Referring provider: Ulises Wells MD  Dx:   Encounter Diagnosis     ICD-10-CM    1  Encounter for other orthopedic aftercare  Z47 89    2  Right ankle pain, unspecified chronicity  M25 571                   Assessment  Assessment details: Pt is a 60y o  year old female coming to outpatient PT s/p bone graft due to talar cyst on 20  Pt presents with increased pain,  decreased R ankle df ROM, decreased R ankle pf strength and overall decreased functional mobility  Pt would benefit from skilled PT services in order to address these deficits and reach maximum level of function  Thank you kindly for the referral!  Impairments: abnormal or restricted ROM, activity intolerance, impaired balance, impaired physical strength, lacks appropriate home exercise program, pain with function and weight-bearing intolerance  Barriers to therapy: 1  High financial cost- limited insurance coverage/ private pay currently  Understanding of Dx/Px/POC: good   Prognosis: good    Goals  STG's ( 3-4 weeks)  1  Pt will be independent in HEP  2  Improve df ROM by 5*-10 *  LTG's ( 6- 8 weeks)  1  Improve FOTO score by 8-10 points  2  Improve ankle pf and df strength by 1/2 grade  3  Pt will have improved standing tolerance without CAM boot  4  Pt will be able to walk short distances without CAM boot  5  Pt will return to recreational walking  6   Pt will be able to go up and down the steps to go into her basement    Plan  Patient would benefit from: PT eval and skilled physical therapy  Planned therapy interventions: joint mobilization, manual therapy, neuromuscular re-education, home exercise program, functional ROM exercises, flexibility, therapeutic activities, stretching, strengthening and therapeutic exercise  Frequency: 2x week  Duration in weeks: 6  Plan of Care beginning date: 2020  Plan of Care expiration date: 2020  Treatment plan discussed with: patient        Subjective Evaluation    History of Present Illness  Mechanism of injury: Pt reports increasing R knee pain and she began limping  2020, R ankle became more painful without relief  X-ray report showed a nonaggressive cyst of the medial talar dome  Pt underwent surgery on 20 to remove cyst on her talus with bone graft  Pt needed to spend several weeks with her LE elevated with NWB  In the past month, pt has transitioned to a CAM boot and walker  Pt reports increased pain all over her body when walking in her CAM boot  Pt is able to stand to do dishes, but it causes increased LBP  Pt has increased pain when walking in her home, standing long time periods, making her bed, household cleaning activities  Pt is not cooking at this time  Pt is avoiding steps 2* increased R knee pain  Pt sleeps well at night  Pt has less pain with sitting  Pt has increased pain when walking on uneven surfaces in the yard      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[employer]  Hobbies: small farm with chickens, reading, artwork, gardening  Gait: pt ambulates with CAM boot, mild antalgic gait pattern  Pain  At best pain ratin  At worst pain ratin  Location: R ankle  Relieving factors: rest    Social Support  Steps to enter house: yes  2  Stairs in house: yes   12  Lives in: [facility]  Lives with: spouse    Employment status: working  Treatments  No previous or current treatments  Patient Goals  Patient goals for therapy: return to sport/leisure activities  Patient goal: to return to recreational walking; to return to [employer] and yoga        Objective     Neurological Testing     Sensation     Ankle/Foot   Left Ankle/Foot   Intact: light touch    Right Ankle/Foot   Intact: light touch     Reflexes   Left   Patellar (L4): normal (2+)  Achilles (S1): normal (2+)    Right   Patellar (L4): normal (2+)  Achilles (S1): normal (2+)    Active Range of Motion   Left Ankle/Foot Normal active range of motion    Right Ankle/Foot   Dorsiflexion (ke): 0 degrees   Plantar flexion: 45 degrees   Inversion: 30 degrees   Eversion: 10 degrees     Additional Active Range of Motion Details  Hip and knee ROM/ strength: WFL's  No TTP R ankle  Surgical incision is well healed with mild soft tissue restriction  Decreased talocural mobility  SLS: NT  Single limb heel raise: NT    Passive Range of Motion     Right Ankle/Foot    Dorsiflexion (ke): 0 degrees   Plantar flexion: 60 degrees   Inversion: 30 degrees   Eversion: 20 degrees     Strength/Myotome Testing     Left Ankle/Foot   Normal strength    Right Ankle/Foot   Dorsiflexion: 4+  Plantar flexion: 3+  Inversion: 5  Eversion: 5      Flowsheet Rows      Most Recent Value   PT/OT G-Codes   Current Score  31   Projected Score  55   Assessment Type  Evaluation            Dx: s/p bone graft with cyst removal on R  talus  EPOC: 11/5/20  CO-MORBIDITIES: R knee pain, LBP  PERSONAL FACTORS:  Sedentary with work; private pay- pt is ok with 45 minutes  Precautions: wean out of CAM boot      Manuals 9/24            R ankle manual stretching             R ankle joint mobs             R calcaneal joint mobs                          Neuro Re-Ed             sidestepping             Tandem walking level              Combo chair: soleus decelleration             Biodex: weight shifting             Biodex: limit of stability                                       Ther Ex             HEP instruction 8'            bike             Heel raises in parallel bars             Standing calf stretch             Mini squats             Seated BAPS board  L2            ankle circles             1/2 kneeling stretch over 2nd MT             Ther Activity                                       Gait Training                                       Modalities

## 2020-09-24 NOTE — LETTER
2020    Bereket Mustafa MD  [address] [facility]  [address] [place] [address] [place]    Patient: Navin Raines   YOB: 1960   Date of Visit: 2020     Encounter Diagnosis     ICD-10-CM    1  Encounter for other orthopedic aftercare  Z47 89    2  Right ankle pain, unspecified chronicity  M25 571        Dear Dr Barr Sa: Thank you for your recent referral of Navdeep Staley  Please review the attached evaluation summary from Chio's recent visit  Please verify that you agree with the plan of care by signing the attached order  If you have any questions or concerns, please do not hesitate to call  I sincerely appreciate the opportunity to share in the care of one of your patients and hope to have another opportunity to work with you in the near future  Sincerely,    Haris Lemos, PT      Referring Provider:      I certify that I have read the below Plan of Care and certify the need for these services furnished under this plan of treatment while under my care  Bereket Mustafa MD  [address] [place] [address] [place] [address]: [phone]          PT Evaluation     Today's date: 2020  Patient name: Navdeep Staley  : 1960  MRN: [id number]  Referring provider: Dario Santoyo MD  Dx:   Encounter Diagnosis     ICD-10-CM    1  Encounter for other orthopedic aftercare  Z47 89    2  Right ankle pain, unspecified chronicity  M25 571                   Assessment  Assessment details: Pt is a 60y o  year old female coming to outpatient PT s/p bone graft due to talar cyst on 20  Pt presents with increased pain,  decreased R ankle df ROM, decreased R ankle pf strength and overall decreased functional mobility  Pt would benefit from skilled PT services in order to address these deficits and reach maximum level of function   Thank you kindly for the referral!  Impairments: abnormal or restricted ROM, activity intolerance, impaired balance, impaired physical strength, lacks appropriate home exercise program, pain with function and weight-bearing intolerance  Barriers to therapy: 1  High financial cost- limited insurance coverage/ private pay currently  Understanding of Dx/Px/POC: good   Prognosis: good    Goals  STG's ( 3-4 weeks)  1  Pt will be independent in HEP  2  Improve df ROM by 5*-10 *  LTG's ( 6- 8 weeks)  1  Improve FOTO score by 8-10 points  2  Improve ankle pf and df strength by 1/2 grade  3  Pt will have improved standing tolerance without CAM boot  4  Pt will be able to walk short distances without CAM boot  5  Pt will return to recreational walking  6  Pt will be able to go up and down the steps to go into her basement    Plan  Patient would benefit from: PT eval and skilled physical therapy  Planned therapy interventions: joint mobilization, manual therapy, neuromuscular re-education, home exercise program, functional ROM exercises, flexibility, therapeutic activities, stretching, strengthening and therapeutic exercise  Frequency: 2x week  Duration in weeks: 6  Plan of Care beginning date: 9/24/2020  Plan of Care expiration date: 11/5/2020  Treatment plan discussed with: patient        Subjective Evaluation    History of Present Illness  Mechanism of injury: Pt reports increasing R knee pain and she began limping  April 2020, R ankle became more painful without relief  X-ray report showed a nonaggressive cyst of the medial talar dome  Pt underwent surgery on 6/29/20 to remove cyst on her talus with bone graft  Pt needed to spend several weeks with her LE elevated with NWB  In the past month, pt has transitioned to a CAM boot and walker  Pt reports increased pain all over her body when walking in her CAM boot  Pt is able to stand to do dishes, but it causes increased LBP  Pt has increased pain when walking in her home, standing long time periods, making her bed, household cleaning activities   Pt is not cooking at this time  Pt is avoiding steps 2* increased R knee pain  Pt sleeps well at night  Pt has less pain with sitting  Pt has increased pain when walking on uneven surfaces in the yard      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  Hobbies: small farm with chickens, reading, artwork, gardening  Gait: pt ambulates with CAM boot, mild antalgic gait pattern  Pain  At best pain ratin  At worst pain ratin  Location: R ankle  Relieving factors: rest    Social Support  Steps to enter house: yes  2  Stairs in house: yes   12  Lives in: [facility]  Lives with: spouse    Employment status: working  Treatments  No previous or current treatments  Patient Goals  Patient goals for therapy: return to sport/leisure activities  Patient goal: to return to recreational walking; to return to [employer]        Objective     Neurological Testing     Sensation     Ankle/Foot   Left Ankle/Foot   Intact: light touch    Right Ankle/Foot   Intact: light touch     Reflexes   Left   Patellar (L4): normal (2+)  Achilles (S1): normal (2+)    Right   Patellar (L4): normal (2+)  Achilles (S1): normal (2+)    Active Range of Motion   Left Ankle/Foot   Normal active range of motion    Right Ankle/Foot   Dorsiflexion (ke): 0 degrees   Plantar flexion: 45 degrees   Inversion: 30 degrees   Eversion: 10 degrees     Additional Active Range of Motion Details  Hip and knee ROM/ strength: WFL's  No TTP R ankle  Surgical incision is well healed with mild soft tissue restriction  Decreased talocural mobility  SLS: NT  Single limb heel raise: NT    Passive Range of Motion     Right Ankle/Foot    Dorsiflexion (ke): 0 degrees   Plantar flexion: 60 degrees   Inversion: 30 degrees   Eversion: 20 degrees     Strength/Myotome Testing     Left Ankle/Foot   Normal strength    Right Ankle/Foot   Dorsiflexion: 4+  Plantar flexion: 3+  Inversion: 5  Eversion: 5      Flowsheet Rows      Most Recent Value   PT/OT G-Codes   Current Score  31 Projected Score  55   Assessment Type  Evaluation            Dx: s/p bone graft with cyst removal on R  talus  EPOC: 11/5/20  CO-MORBIDITIES: R knee pain, LBP  PERSONAL FACTORS:  Sedentary with work; private pay- pt is ok with 45 minutes  Precautions: wean out of CAM boot      Manuals 9/24            R ankle manual stretching             R ankle joint mobs             R calcaneal joint mobs                          Neuro Re-Ed             sidestepping             Tandem walking level              Combo chair: soleus decelleration             Biodex: weight shifting             Biodex: limit of stability                                       Ther Ex             HEP instruction 8'            bike             Heel raises in parallel bars             Standing calf stretch             Mini squats             Seated BAPS board  L2            ankle circles             1/2 kneeling stretch over 2nd MT             Ther Activity                                       Gait Training                                       Modalities

## 2020-09-28 ENCOUNTER — OFFICE VISIT (OUTPATIENT)
Dept: PHYSICAL THERAPY | Facility: CLINIC | Age: 60
End: 2020-09-28
Payer: COMMERCIAL

## 2020-09-28 DIAGNOSIS — M25.571 RIGHT ANKLE PAIN, UNSPECIFIED CHRONICITY: ICD-10-CM

## 2020-09-28 DIAGNOSIS — Z47.89 ENCOUNTER FOR OTHER ORTHOPEDIC AFTERCARE: Primary | ICD-10-CM

## 2020-09-28 PROCEDURE — 97140 MANUAL THERAPY 1/> REGIONS: CPT

## 2020-09-28 PROCEDURE — 97110 THERAPEUTIC EXERCISES: CPT

## 2020-09-28 PROCEDURE — 97112 NEUROMUSCULAR REEDUCATION: CPT

## 2020-09-28 NOTE — PROGRESS NOTES
Daily Note     Today's date: 2020  Patient name: Debbi Olson  : 1960  MRN: [id number]  Referring provider: Ruben Feng MD  Dx:   Encounter Diagnosis     ICD-10-CM    1  Encounter for other orthopedic aftercare  Z47 [id number]    2  Right ankle pain, unspecified chronicity  M25 [id number]                   Subjective: Pt reports she is sore today in the knee  Reports sometimes the outside of her foot hurts  Objective: See treatment diary below      Assessment: Tolerated treatment fair  Patient worked w/o shoes donned  Pt fatigued quickly in general and LE's  Pt able to do Biodex w/in limits instructed, but fatigued  Plan: Continue per plan of care  Progress treatment as tolerated         Dx: s/p bone graft with cyst removal on R  talus  EPOC: 20  CO-MORBIDITIES: R knee pain, LBP  PERSONAL FACTORS:  Sedentary with work; private pay- pt is ok with 45 minutes  Precautions: wean out of CAM boot      Manuals            R ankle manual stretching  jk           R ankle joint mobs             R calcaneal joint mobs               10'           Neuro Re-Ed             sidestepping  2 laps           Tandem walking level              Combo chair: soleus decelleration             Biodex: weight shifting  Ml 1'  Ap 1' x2 ea           Biodex: limit of stability  nv                                     Ther Ex             HEP instruction 8'            bike  5'           Heel raises in parallel bars  10x           Standing calf stretch  3x20"           Mini squats  5"x10           Seated BAPS board  L2 L2 mp/ap 10x ea           ankle circles  20x           1/2 kneeling stretch over 2nd MT             Ther Activity                                       Gait Training                                       Modalities

## 2020-10-01 ENCOUNTER — OFFICE VISIT (OUTPATIENT)
Dept: PHYSICAL THERAPY | Facility: CLINIC | Age: 60
End: 2020-10-01
Payer: COMMERCIAL

## 2020-10-01 DIAGNOSIS — M25.571 RIGHT ANKLE PAIN, UNSPECIFIED CHRONICITY: ICD-10-CM

## 2020-10-01 DIAGNOSIS — Z47.89 ENCOUNTER FOR OTHER ORTHOPEDIC AFTERCARE: Primary | ICD-10-CM

## 2020-10-01 PROCEDURE — 97110 THERAPEUTIC EXERCISES: CPT

## 2020-10-01 PROCEDURE — 97112 NEUROMUSCULAR REEDUCATION: CPT

## 2020-10-01 PROCEDURE — 97140 MANUAL THERAPY 1/> REGIONS: CPT

## 2020-10-05 ENCOUNTER — OFFICE VISIT (OUTPATIENT)
Dept: PHYSICAL THERAPY | Facility: CLINIC | Age: 60
End: 2020-10-05
Payer: COMMERCIAL

## 2020-10-05 DIAGNOSIS — Z47.89 ENCOUNTER FOR OTHER ORTHOPEDIC AFTERCARE: Primary | ICD-10-CM

## 2020-10-05 DIAGNOSIS — M25.571 RIGHT ANKLE PAIN, UNSPECIFIED CHRONICITY: ICD-10-CM

## 2020-10-05 PROCEDURE — 97112 NEUROMUSCULAR REEDUCATION: CPT

## 2020-10-05 PROCEDURE — 97110 THERAPEUTIC EXERCISES: CPT

## 2020-10-08 ENCOUNTER — OFFICE VISIT (OUTPATIENT)
Dept: PHYSICAL THERAPY | Facility: CLINIC | Age: 60
End: 2020-10-08
Payer: COMMERCIAL

## 2020-10-08 DIAGNOSIS — Z47.89 ENCOUNTER FOR OTHER ORTHOPEDIC AFTERCARE: Primary | ICD-10-CM

## 2020-10-08 DIAGNOSIS — M25.571 RIGHT ANKLE PAIN, UNSPECIFIED CHRONICITY: ICD-10-CM

## 2020-10-08 PROCEDURE — 97112 NEUROMUSCULAR REEDUCATION: CPT | Performed by: PHYSICAL THERAPIST

## 2020-10-08 PROCEDURE — 97140 MANUAL THERAPY 1/> REGIONS: CPT | Performed by: PHYSICAL THERAPIST

## 2020-10-08 PROCEDURE — 97110 THERAPEUTIC EXERCISES: CPT | Performed by: PHYSICAL THERAPIST

## 2020-10-12 ENCOUNTER — APPOINTMENT (OUTPATIENT)
Dept: PHYSICAL THERAPY | Facility: CLINIC | Age: 60
End: 2020-10-12
Payer: COMMERCIAL

## 2020-10-13 ENCOUNTER — EVALUATION (OUTPATIENT)
Dept: PHYSICAL THERAPY | Facility: CLINIC | Age: 60
End: 2020-10-13
Payer: COMMERCIAL

## 2020-10-13 DIAGNOSIS — Z47.89 ENCOUNTER FOR OTHER ORTHOPEDIC AFTERCARE: Primary | ICD-10-CM

## 2020-10-13 DIAGNOSIS — M25.571 RIGHT ANKLE PAIN, UNSPECIFIED CHRONICITY: ICD-10-CM

## 2020-10-13 PROCEDURE — 97140 MANUAL THERAPY 1/> REGIONS: CPT | Performed by: PHYSICAL THERAPIST

## 2020-10-13 PROCEDURE — 97112 NEUROMUSCULAR REEDUCATION: CPT | Performed by: PHYSICAL THERAPIST

## 2020-10-13 PROCEDURE — 97110 THERAPEUTIC EXERCISES: CPT | Performed by: PHYSICAL THERAPIST

## 2020-10-15 ENCOUNTER — APPOINTMENT (OUTPATIENT)
Dept: PHYSICAL THERAPY | Facility: CLINIC | Age: 60
End: 2020-10-15
Payer: COMMERCIAL

## 2020-10-19 ENCOUNTER — EVALUATION (OUTPATIENT)
Dept: PHYSICAL THERAPY | Facility: CLINIC | Age: 60
End: 2020-10-19
Payer: COMMERCIAL

## 2020-10-19 DIAGNOSIS — Z47.89 ENCOUNTER FOR OTHER ORTHOPEDIC AFTERCARE: Primary | ICD-10-CM

## 2020-10-19 DIAGNOSIS — M25.571 RIGHT ANKLE PAIN, UNSPECIFIED CHRONICITY: ICD-10-CM

## 2020-10-19 PROCEDURE — 97140 MANUAL THERAPY 1/> REGIONS: CPT | Performed by: PHYSICAL THERAPIST

## 2020-10-19 PROCEDURE — 97110 THERAPEUTIC EXERCISES: CPT | Performed by: PHYSICAL THERAPIST

## 2020-10-19 PROCEDURE — 97112 NEUROMUSCULAR REEDUCATION: CPT | Performed by: PHYSICAL THERAPIST

## 2020-10-22 ENCOUNTER — APPOINTMENT (OUTPATIENT)
Dept: PHYSICAL THERAPY | Facility: CLINIC | Age: 60
End: 2020-10-22
Payer: COMMERCIAL

## 2020-10-26 ENCOUNTER — EVALUATION (OUTPATIENT)
Dept: PHYSICAL THERAPY | Facility: CLINIC | Age: 60
End: 2020-10-26
Payer: COMMERCIAL

## 2020-10-26 ENCOUNTER — TRANSCRIBE ORDERS (OUTPATIENT)
Dept: PHYSICAL THERAPY | Facility: CLINIC | Age: 60
End: 2020-10-26

## 2020-10-26 DIAGNOSIS — M25.571 RIGHT ANKLE PAIN, UNSPECIFIED CHRONICITY: ICD-10-CM

## 2020-10-26 DIAGNOSIS — Z47.89 ENCOUNTER FOR OTHER ORTHOPEDIC AFTERCARE: Primary | ICD-10-CM

## 2020-10-26 PROCEDURE — 97110 THERAPEUTIC EXERCISES: CPT | Performed by: PHYSICAL THERAPIST

## 2020-10-26 PROCEDURE — 97140 MANUAL THERAPY 1/> REGIONS: CPT | Performed by: PHYSICAL THERAPIST

## 2020-10-26 PROCEDURE — 97112 NEUROMUSCULAR REEDUCATION: CPT | Performed by: PHYSICAL THERAPIST

## 2020-10-29 ENCOUNTER — APPOINTMENT (OUTPATIENT)
Dept: PHYSICAL THERAPY | Facility: CLINIC | Age: 60
End: 2020-10-29
Payer: COMMERCIAL

## 2020-11-02 ENCOUNTER — APPOINTMENT (OUTPATIENT)
Dept: PHYSICAL THERAPY | Facility: CLINIC | Age: 60
End: 2020-11-02
Payer: COMMERCIAL

## 2020-11-09 ENCOUNTER — EVALUATION (OUTPATIENT)
Dept: PHYSICAL THERAPY | Facility: CLINIC | Age: 60
End: 2020-11-09
Payer: COMMERCIAL

## 2020-11-09 DIAGNOSIS — M25.571 RIGHT ANKLE PAIN, UNSPECIFIED CHRONICITY: ICD-10-CM

## 2020-11-09 DIAGNOSIS — Z47.89 ENCOUNTER FOR OTHER ORTHOPEDIC AFTERCARE: Primary | ICD-10-CM

## 2020-11-09 PROCEDURE — 97140 MANUAL THERAPY 1/> REGIONS: CPT | Performed by: PHYSICAL THERAPIST

## 2020-11-09 PROCEDURE — 97110 THERAPEUTIC EXERCISES: CPT | Performed by: PHYSICAL THERAPIST

## 2020-11-16 ENCOUNTER — APPOINTMENT (OUTPATIENT)
Dept: PHYSICAL THERAPY | Facility: CLINIC | Age: 60
End: 2020-11-16
Payer: COMMERCIAL

## 2020-11-23 ENCOUNTER — APPOINTMENT (OUTPATIENT)
Dept: PHYSICAL THERAPY | Facility: CLINIC | Age: 60
End: 2020-11-23
Payer: COMMERCIAL

## 2020-12-09 ENCOUNTER — OFFICE VISIT (OUTPATIENT)
Dept: FAMILY MEDICINE CLINIC | Facility: CLINIC | Age: 60
End: 2020-12-09
Payer: COMMERCIAL

## 2020-12-09 VITALS
DIASTOLIC BLOOD PRESSURE: 85 MMHG | OXYGEN SATURATION: 99 % | HEIGHT: 64 IN | BODY MASS INDEX: 50.02 KG/M2 | SYSTOLIC BLOOD PRESSURE: 126 MMHG | WEIGHT: 293 LBS | HEART RATE: 93 BPM | TEMPERATURE: 98 F

## 2020-12-09 DIAGNOSIS — Z11.59 ENCOUNTER FOR HEPATITIS C SCREENING TEST FOR LOW RISK PATIENT: ICD-10-CM

## 2020-12-09 DIAGNOSIS — I10 WHITE COAT SYNDROME WITH DIAGNOSIS OF HYPERTENSION: ICD-10-CM

## 2020-12-09 DIAGNOSIS — M17.11 PRIMARY OSTEOARTHRITIS OF RIGHT KNEE: Primary | ICD-10-CM

## 2020-12-09 DIAGNOSIS — I10 BENIGN ESSENTIAL HTN: ICD-10-CM

## 2020-12-09 DIAGNOSIS — F41.1 GENERALIZED ANXIETY DISORDER: ICD-10-CM

## 2020-12-09 DIAGNOSIS — E03.8 OTHER SPECIFIED HYPOTHYROIDISM: ICD-10-CM

## 2020-12-09 DIAGNOSIS — J45.30 MILD PERSISTENT ASTHMA WITHOUT COMPLICATION: ICD-10-CM

## 2020-12-09 DIAGNOSIS — E66.01 CLASS 3 SEVERE OBESITY WITHOUT SERIOUS COMORBIDITY WITH BODY MASS INDEX (BMI) OF 50.0 TO 59.9 IN ADULT, UNSPECIFIED OBESITY TYPE (HCC): ICD-10-CM

## 2020-12-09 PROBLEM — J45.909 ASTHMA: Status: ACTIVE | Noted: 2018-11-21

## 2020-12-09 PROCEDURE — 99204 OFFICE O/P NEW MOD 45 MIN: CPT | Performed by: FAMILY MEDICINE

## 2020-12-09 PROCEDURE — 3725F SCREEN DEPRESSION PERFORMED: CPT | Performed by: FAMILY MEDICINE

## 2020-12-09 RX ORDER — SENNOSIDES 8.6 MG
8.6 CAPSULE ORAL 2 TIMES DAILY
COMMUNITY
Start: 2020-06-29 | End: 2020-12-09 | Stop reason: ALTCHOICE

## 2020-12-09 RX ORDER — ESTRADIOL 2 MG/1
RING VAGINAL
COMMUNITY
End: 2021-11-30

## 2020-12-09 RX ORDER — ACETAMINOPHEN 160 MG
4000 TABLET,DISINTEGRATING ORAL
COMMUNITY

## 2020-12-09 RX ORDER — ACETAMINOPHEN 500 MG
500 TABLET ORAL
COMMUNITY
End: 2020-12-09 | Stop reason: ALTCHOICE

## 2020-12-09 RX ORDER — ALPRAZOLAM 0.5 MG/1
TABLET ORAL 3 TIMES DAILY
COMMUNITY
End: 2021-06-29 | Stop reason: SDUPTHER

## 2020-12-16 ENCOUNTER — APPOINTMENT (OUTPATIENT)
Dept: RADIOLOGY | Facility: CLINIC | Age: 60
End: 2020-12-16
Payer: COMMERCIAL

## 2020-12-16 ENCOUNTER — CONSULT (OUTPATIENT)
Dept: OBGYN CLINIC | Facility: CLINIC | Age: 60
End: 2020-12-16
Payer: COMMERCIAL

## 2020-12-16 VITALS
HEIGHT: 64 IN | HEART RATE: 90 BPM | WEIGHT: 293 LBS | SYSTOLIC BLOOD PRESSURE: 166 MMHG | DIASTOLIC BLOOD PRESSURE: 100 MMHG | TEMPERATURE: 98.2 F | BODY MASS INDEX: 50.02 KG/M2

## 2020-12-16 DIAGNOSIS — M17.11 PRIMARY OSTEOARTHRITIS OF RIGHT KNEE: Primary | ICD-10-CM

## 2020-12-16 DIAGNOSIS — M25.561 RIGHT KNEE PAIN, UNSPECIFIED CHRONICITY: ICD-10-CM

## 2020-12-16 PROCEDURE — 73562 X-RAY EXAM OF KNEE 3: CPT

## 2020-12-16 PROCEDURE — 3008F BODY MASS INDEX DOCD: CPT | Performed by: ORTHOPAEDIC SURGERY

## 2020-12-16 PROCEDURE — 1036F TOBACCO NON-USER: CPT | Performed by: ORTHOPAEDIC SURGERY

## 2020-12-16 PROCEDURE — 3080F DIAST BP >= 90 MM HG: CPT | Performed by: ORTHOPAEDIC SURGERY

## 2020-12-16 PROCEDURE — 73564 X-RAY EXAM KNEE 4 OR MORE: CPT

## 2020-12-16 PROCEDURE — 20610 DRAIN/INJ JOINT/BURSA W/O US: CPT | Performed by: ORTHOPAEDIC SURGERY

## 2020-12-16 PROCEDURE — 99243 OFF/OP CNSLTJ NEW/EST LOW 30: CPT | Performed by: ORTHOPAEDIC SURGERY

## 2020-12-16 PROCEDURE — 3077F SYST BP >= 140 MM HG: CPT | Performed by: ORTHOPAEDIC SURGERY

## 2020-12-16 RX ORDER — METHYLPREDNISOLONE ACETATE 40 MG/ML
1 INJECTION, SUSPENSION INTRA-ARTICULAR; INTRALESIONAL; INTRAMUSCULAR; SOFT TISSUE
Status: COMPLETED | OUTPATIENT
Start: 2020-12-16 | End: 2020-12-16

## 2020-12-16 RX ORDER — LIDOCAINE HYDROCHLORIDE 10 MG/ML
3 INJECTION, SOLUTION INFILTRATION; PERINEURAL
Status: COMPLETED | OUTPATIENT
Start: 2020-12-16 | End: 2020-12-16

## 2020-12-16 RX ADMIN — LIDOCAINE HYDROCHLORIDE 3 ML: 10 INJECTION, SOLUTION INFILTRATION; PERINEURAL at 10:28

## 2020-12-16 RX ADMIN — METHYLPREDNISOLONE ACETATE 1 ML: 40 INJECTION, SUSPENSION INTRA-ARTICULAR; INTRALESIONAL; INTRAMUSCULAR; SOFT TISSUE at 10:28

## 2020-12-22 DIAGNOSIS — I10 WHITE COAT SYNDROME WITH DIAGNOSIS OF HYPERTENSION: Primary | ICD-10-CM

## 2020-12-22 RX ORDER — LISINOPRIL 5 MG/1
10 TABLET ORAL DAILY
Qty: 180 TABLET | Refills: 1 | Status: SHIPPED | OUTPATIENT
Start: 2020-12-22 | End: 2021-01-25 | Stop reason: SDUPTHER

## 2020-12-24 ENCOUNTER — HOSPITAL ENCOUNTER (OUTPATIENT)
Dept: RADIOLOGY | Facility: HOSPITAL | Age: 60
Discharge: HOME | End: 2020-12-24
Payer: COMMERCIAL

## 2020-12-24 ENCOUNTER — TRANSCRIBE ORDERS (OUTPATIENT)
Dept: ADMINISTRATIVE | Facility: HOSPITAL | Age: 60
End: 2020-12-24

## 2020-12-24 DIAGNOSIS — M85.671 OTHER CYST OF BONE, RIGHT ANKLE AND FOOT: ICD-10-CM

## 2020-12-24 DIAGNOSIS — M85.671 OTHER CYST OF BONE, RIGHT ANKLE AND FOOT: Primary | ICD-10-CM

## 2020-12-24 PROCEDURE — 73610 X-RAY EXAM OF ANKLE: CPT

## 2020-12-28 RX ORDER — LISINOPRIL 2.5 MG/1
2.5 TABLET ORAL 2 TIMES DAILY
COMMUNITY
Start: 2020-12-12 | End: 2021-04-20 | Stop reason: SDUPTHER

## 2020-12-28 RX ORDER — THYROID 60 MG
120 TABLET ORAL DAILY
COMMUNITY
Start: 2020-12-12 | End: 2021-04-05 | Stop reason: ALTCHOICE

## 2020-12-31 ENCOUNTER — CLINICAL SUPPORT (OUTPATIENT)
Dept: FAMILY MEDICINE CLINIC | Facility: CLINIC | Age: 60
End: 2020-12-31
Payer: COMMERCIAL

## 2020-12-31 DIAGNOSIS — Z13.228 SCREENING FOR ENDOCRINE, NUTRITIONAL, METABOLIC AND IMMUNITY DISORDER: ICD-10-CM

## 2020-12-31 DIAGNOSIS — E03.8 OTHER SPECIFIED HYPOTHYROIDISM: ICD-10-CM

## 2020-12-31 DIAGNOSIS — I10 BENIGN ESSENTIAL HTN: ICD-10-CM

## 2020-12-31 DIAGNOSIS — Z13.220 SCREENING FOR LIPID DISORDERS: ICD-10-CM

## 2020-12-31 DIAGNOSIS — Z11.59 NEED FOR HEPATITIS C SCREENING TEST: Primary | ICD-10-CM

## 2020-12-31 DIAGNOSIS — Z13.21 SCREENING FOR ENDOCRINE, NUTRITIONAL, METABOLIC AND IMMUNITY DISORDER: ICD-10-CM

## 2020-12-31 DIAGNOSIS — Z13.0 SCREENING FOR IRON DEFICIENCY ANEMIA: ICD-10-CM

## 2020-12-31 DIAGNOSIS — Z13.29 SCREENING FOR ENDOCRINE, NUTRITIONAL, METABOLIC AND IMMUNITY DISORDER: ICD-10-CM

## 2020-12-31 DIAGNOSIS — Z13.0 SCREENING FOR ENDOCRINE, NUTRITIONAL, METABOLIC AND IMMUNITY DISORDER: ICD-10-CM

## 2020-12-31 PROCEDURE — 36415 COLL VENOUS BLD VENIPUNCTURE: CPT

## 2021-01-01 LAB
ALBUMIN SERPL-MCNC: 4.5 G/DL (ref 3.8–4.8)
ALBUMIN/GLOB SERPL: 1.8 {RATIO} (ref 1.2–2.2)
ALP SERPL-CCNC: 80 IU/L (ref 39–117)
ALT SERPL-CCNC: 16 IU/L (ref 0–32)
AST SERPL-CCNC: 16 IU/L (ref 0–40)
BASOPHILS # BLD AUTO: 0 X10E3/UL (ref 0–0.2)
BASOPHILS NFR BLD AUTO: 0 %
BILIRUB SERPL-MCNC: 0.6 MG/DL (ref 0–1.2)
BUN SERPL-MCNC: 11 MG/DL (ref 8–27)
BUN/CREAT SERPL: 17 (ref 12–28)
CALCIUM SERPL-MCNC: 9.7 MG/DL (ref 8.7–10.3)
CHLORIDE SERPL-SCNC: 96 MMOL/L (ref 96–106)
CHOLEST SERPL-MCNC: 197 MG/DL (ref 100–199)
CHOLEST/HDLC SERPL: 2.5 RATIO (ref 0–4.4)
CO2 SERPL-SCNC: 25 MMOL/L (ref 20–29)
CREAT SERPL-MCNC: 0.64 MG/DL (ref 0.57–1)
EOSINOPHIL # BLD AUTO: 0.2 X10E3/UL (ref 0–0.4)
EOSINOPHIL NFR BLD AUTO: 1 %
ERYTHROCYTE [DISTWIDTH] IN BLOOD BY AUTOMATED COUNT: 14.1 % (ref 11.7–15.4)
GLOBULIN SER-MCNC: 2.5 G/DL (ref 1.5–4.5)
GLUCOSE SERPL-MCNC: 96 MG/DL (ref 65–99)
HCT VFR BLD AUTO: 36.2 % (ref 34–46.6)
HCV AB S/CO SERPL IA: <0.1 S/CO RATIO (ref 0–0.9)
HDLC SERPL-MCNC: 79 MG/DL
HGB BLD-MCNC: 12.1 G/DL (ref 11.1–15.9)
IMM GRANULOCYTES # BLD: 0.1 X10E3/UL (ref 0–0.1)
IMM GRANULOCYTES NFR BLD: 1 %
LDLC SERPL CALC-MCNC: 104 MG/DL (ref 0–99)
LYMPHOCYTES # BLD AUTO: 2.6 X10E3/UL (ref 0.7–3.1)
LYMPHOCYTES NFR BLD AUTO: 20 %
MCH RBC QN AUTO: 28.2 PG (ref 26.6–33)
MCHC RBC AUTO-ENTMCNC: 33.4 G/DL (ref 31.5–35.7)
MCV RBC AUTO: 84 FL (ref 79–97)
MONOCYTES # BLD AUTO: 0.8 X10E3/UL (ref 0.1–0.9)
MONOCYTES NFR BLD AUTO: 6 %
NEUTROPHILS # BLD AUTO: 9.4 X10E3/UL (ref 1.4–7)
NEUTROPHILS NFR BLD AUTO: 72 %
PLATELET # BLD AUTO: 380 X10E3/UL (ref 150–450)
POTASSIUM SERPL-SCNC: 4.7 MMOL/L (ref 3.5–5.2)
PROT SERPL-MCNC: 7 G/DL (ref 6–8.5)
RBC # BLD AUTO: 4.29 X10E6/UL (ref 3.77–5.28)
SL AMB EGFR AFRICAN AMERICAN: 111 ML/MIN/1.73
SL AMB EGFR NON AFRICAN AMERICAN: 97 ML/MIN/1.73
SL AMB VLDL CHOLESTEROL CALC: 14 MG/DL (ref 5–40)
SODIUM SERPL-SCNC: 134 MMOL/L (ref 134–144)
T3FREE SERPL-MCNC: 2.2 PG/ML (ref 2–4.4)
T4 FREE SERPL-MCNC: 0.9 NG/DL (ref 0.82–1.77)
TRIGL SERPL-MCNC: 80 MG/DL (ref 0–149)
TSH SERPL DL<=0.005 MIU/L-ACNC: 3.28 UIU/ML (ref 0.45–4.5)
WBC # BLD AUTO: 13 X10E3/UL (ref 3.4–10.8)

## 2021-01-08 ENCOUNTER — OFFICE VISIT (OUTPATIENT)
Dept: FAMILY MEDICINE CLINIC | Facility: CLINIC | Age: 61
End: 2021-01-08
Payer: COMMERCIAL

## 2021-01-08 VITALS
HEIGHT: 64 IN | SYSTOLIC BLOOD PRESSURE: 142 MMHG | HEART RATE: 90 BPM | BODY MASS INDEX: 50.02 KG/M2 | WEIGHT: 293 LBS | OXYGEN SATURATION: 99 % | DIASTOLIC BLOOD PRESSURE: 78 MMHG | TEMPERATURE: 98.2 F

## 2021-01-08 DIAGNOSIS — Z12.31 ENCOUNTER FOR SCREENING MAMMOGRAM FOR MALIGNANT NEOPLASM OF BREAST: ICD-10-CM

## 2021-01-08 DIAGNOSIS — Z23 NEED FOR TDAP VACCINATION: ICD-10-CM

## 2021-01-08 DIAGNOSIS — Z12.11 SCREENING FOR COLON CANCER: ICD-10-CM

## 2021-01-08 DIAGNOSIS — Z00.00 ANNUAL PHYSICAL EXAM: Primary | ICD-10-CM

## 2021-01-08 DIAGNOSIS — D72.820 LYMPHOCYTOSIS: ICD-10-CM

## 2021-01-08 DIAGNOSIS — E03.8 OTHER SPECIFIED HYPOTHYROIDISM: ICD-10-CM

## 2021-01-08 PROCEDURE — 90471 IMMUNIZATION ADMIN: CPT

## 2021-01-08 PROCEDURE — 99396 PREV VISIT EST AGE 40-64: CPT | Performed by: FAMILY MEDICINE

## 2021-01-08 PROCEDURE — 90715 TDAP VACCINE 7 YRS/> IM: CPT

## 2021-01-08 PROCEDURE — 3077F SYST BP >= 140 MM HG: CPT | Performed by: FAMILY MEDICINE

## 2021-01-08 PROCEDURE — 3008F BODY MASS INDEX DOCD: CPT | Performed by: FAMILY MEDICINE

## 2021-01-08 PROCEDURE — 3078F DIAST BP <80 MM HG: CPT | Performed by: FAMILY MEDICINE

## 2021-01-08 PROCEDURE — 1036F TOBACCO NON-USER: CPT | Performed by: FAMILY MEDICINE

## 2021-01-08 NOTE — PROGRESS NOTES
[address] [id number]    NAME: Chio Raines  AGE: 63 y o  SEX: female  : 1960     DATE: 2021     Assessment and Plan:     Problem List Items Addressed This Visit        Endocrine    Other specified hypothyroidism     The patient is unsure if she ever had true clinical hypothyroidism, never had symptoms, diagnosed by labs  She would like to come off of the thyroid medication if at all possible  I am going to have her cut the dose in half and start taking 60 mg of [place] Thyroid instead of 120 mg  Will recheck the labs in 6-12 weeks  At that time we are also rechecking her CBC for the elevated white blood cell count in her current labs  Relevant Orders    TSH, 3rd generation    T3    T4, free      Other Visit Diagnoses     Annual physical exam    -  Primary    Encounter for screening mammogram for malignant neoplasm of breast        Relevant Orders    Mammo screening bilateral w 3d & cad    Need for Tdap vaccination        Relevant Orders    Tdap vaccine greater than or equal to 8yo IM (Completed)    Screening for colon cancer        Relevant Orders    Cologuard    Lymphocytosis        Relevant Orders    CBC and differential          Immunizations and preventive care screenings were discussed with patient today  Appropriate education was printed on patient's after visit summary  Counseling:  Dental Health: discussed importance of regular tooth brushing, flossing, and dental visits  · Exercise: the importance of regular exercise/physical activity was discussed  Recommend exercise 3-5 times per week for at least 30 minutes  Return for Labs 8-12 weeks  Chief Complaint:     Chief Complaint   Patient presents with    Annual Exam     PT COMES IN FOR HER YEARLY PHYSICAL AND REVIEW LABS  TDAP TODAY          History of Present Illness:     Adult Annual Physical   Patient here for a comprehensive physical exam  The patient reports no problems  Diet and Physical Activity  · Diet/Nutrition: well balanced diet  · Exercise: walking and moderate cardiovascular exercise  Depression Screening  PHQ-9 Depression Screening    PHQ-9:   Frequency of the following problems over the past two weeks:           General Health  · Hearing: decreased - bilateral Likely related to noise exposure over the years, not bothersome enough to require audiology eval   · Vision: no vision problems       /GYN Health  · Patient is: postmenopausal     Review of Systems:     Review of Systems   Past Medical History:     Past Medical History:   Diagnosis Date    Allergic     pollen    Anxiety     Asthma     sleep apnea    Disease of thyroid gland     Hypertension       Past Surgical History:     Past Surgical History:   Procedure Laterality Date    ANKLE SURGERY      s/p bone graft to talus due to talar cyst      Social History:        Social History     Socioeconomic History    Marital status: /Civil Union     Spouse name: None    Number of children: None    Years of education: None    Highest education level: None   Occupational History    None   Social Needs    Financial resource strain: None    Food insecurity     Worry: None     Inability: None    Transportation needs     Medical: None     Non-medical: None   Tobacco Use    Smoking status: Never Smoker    Smokeless tobacco: Never Used   Substance and Sexual Activity    Alcohol use: None    Drug use: None    Sexual activity: None   Lifestyle    Physical activity     Days per week: None     Minutes per session: None    Stress: None   Relationships    Social connections     Talks on phone: None     Gets together: None     Attends Congregational service: None     Active member of club or organization: None     Attends meetings of clubs or organizations: None     Relationship status: None    Intimate partner violence     Fear of current or ex partner: None     Emotionally abused: None     Physically abused: None     Forced sexual activity: None   Other Topics Concern    None   Social History Narrative    None      Family History:     Family History   Problem Relation Age of Onset    Diabetes Mother     Stroke Mother     Cancer Father       Current Medications:     Current Outpatient Medications   Medication Sig Dispense Refill    Albuterol Sulfate 108 (90 Base) MCG/ACT AEPB Inhale 1 puff      ALPRAZolam (Xanax) 0 5 mg tablet 3 (three) times a day      [place] Thyroid 60 MG tablet Take 120 mg by mouth daily      Ascorbic Acid (VITAMIN C PO) Vitamin C      Cholecalciferol (Vitamin D3) 50 MCG (2000 UT) capsule Vitamin D3 50 mcg (2,000 unit) capsule   Take by oral route   estradiol (Estring) 2 MG vaginal ring Estring 2 mg (7 5 mcg/24 hour) vaginal ring   INSERT 1 VAGINAL RING(S) BY VAGINAL ROUTE FOR 90 DAYS   fluticasone furoate-vilanterol (BREO ELLIPTA) Inhale      lisinopril (ZESTRIL) 2 5 mg tablet Take 2 5 mg by mouth 2 (two) times a day      lisinopril (ZESTRIL) 5 mg tablet Take 2 tablets (10 mg total) by mouth daily 180 tablet 1     No current facility-administered medications for this visit  Allergies: Allergies   Allergen Reactions    Dust Mite Extract Cough    Molds & Smuts Cough    Pollen Extract Cough      Physical Exam:     /78   Pulse 90   Temp 98 2 °F (36 8 °C)   Ht 5' 4" (1 626 m)   Wt 135 kg (298 lb)   SpO2 99%   BMI 51 15 kg/m²     Physical Exam  Constitutional:       Appearance: She is well-developed  HENT:      Head: Normocephalic and atraumatic  Right Ear: Tympanic membrane, ear canal and external ear normal       Left Ear: Tympanic membrane, ear canal and external ear normal    Eyes:      Conjunctiva/sclera: Conjunctivae normal    Neck:      Musculoskeletal: Normal range of motion and neck supple  Cardiovascular:      Rate and Rhythm: Normal rate and regular rhythm  Heart sounds: Normal heart sounds   No murmur  Pulmonary:      Effort: Pulmonary effort is normal  No respiratory distress  Breath sounds: Normal breath sounds  Abdominal:      General: Bowel sounds are normal  There is no distension  Palpations: Abdomen is soft  Tenderness: There is no abdominal tenderness  Musculoskeletal: Normal range of motion  Skin:     General: Skin is warm and dry  Findings: No rash  Neurological:      General: No focal deficit present  Mental Status: She is alert and oriented to person, place, and time  Psychiatric:         Mood and Affect: Mood normal          Behavior: Behavior normal          Thought Content:  Thought content normal          Judgment: Judgment normal           MD Hermes Patterson

## 2021-01-08 NOTE — PATIENT INSTRUCTIONS

## 2021-01-08 NOTE — ASSESSMENT & PLAN NOTE
The patient is unsure if she ever had true clinical hypothyroidism, never had symptoms, diagnosed by labs  She would like to come off of the thyroid medication if at all possible  I am going to have her cut the dose in half and start taking 60 mg of [place] Thyroid instead of 120 mg  Will recheck the labs in 6-12 weeks  At that time we are also rechecking her CBC for the elevated white blood cell count in her current labs

## 2021-01-25 DIAGNOSIS — I10 WHITE COAT SYNDROME WITH DIAGNOSIS OF HYPERTENSION: ICD-10-CM

## 2021-01-25 RX ORDER — LISINOPRIL 5 MG/1
10 TABLET ORAL DAILY
Qty: 180 TABLET | Refills: 0 | Status: SHIPPED | OUTPATIENT
Start: 2021-01-25 | End: 2021-05-27

## 2021-01-26 ENCOUNTER — TELEPHONE (OUTPATIENT)
Dept: FAMILY MEDICINE CLINIC | Facility: CLINIC | Age: 61
End: 2021-01-26

## 2021-01-26 RX ORDER — BUDESONIDE AND FORMOTEROL FUMARATE DIHYDRATE 160; 4.5 UG/1; UG/1
1 AEROSOL RESPIRATORY (INHALATION) DAILY
COMMUNITY
Start: 2021-01-20 | End: 2021-11-30 | Stop reason: ALTCHOICE

## 2021-01-26 NOTE — TELEPHONE ENCOUNTER
[facility] told pt that she cannot get her Lisinopril refilled  They are telling her it was just recently refilled and she cannot get it refilled for February  Pt does not have any sign of the medication being refilled  She tore her house apart and cannot find it anywhere

## 2021-03-01 DIAGNOSIS — E03.8 OTHER SPECIFIED HYPOTHYROIDISM: Primary | ICD-10-CM

## 2021-03-01 RX ORDER — LEVOTHYROXINE SODIUM 0.15 MG/1
150 TABLET ORAL
Qty: 90 TABLET | Refills: 0 | Status: SHIPPED | OUTPATIENT
Start: 2021-03-01 | End: 2021-04-30

## 2021-03-10 DIAGNOSIS — Z23 ENCOUNTER FOR IMMUNIZATION: ICD-10-CM

## 2021-03-29 ENCOUNTER — IMMUNIZATIONS (OUTPATIENT)
Dept: FAMILY MEDICINE CLINIC | Facility: HOSPITAL | Age: 61
End: 2021-03-29

## 2021-03-29 DIAGNOSIS — Z23 ENCOUNTER FOR IMMUNIZATION: Primary | ICD-10-CM

## 2021-03-29 PROCEDURE — 91300 SARS-COV-2 / COVID-19 MRNA VACCINE (PFIZER-BIONTECH) 30 MCG: CPT

## 2021-03-29 PROCEDURE — 0001A SARS-COV-2 / COVID-19 MRNA VACCINE (PFIZER-BIONTECH) 30 MCG: CPT

## 2021-04-05 ENCOUNTER — PATIENT MESSAGE (OUTPATIENT)
Dept: FAMILY MEDICINE CLINIC | Facility: CLINIC | Age: 61
End: 2021-04-05

## 2021-04-05 ENCOUNTER — TELEPHONE (OUTPATIENT)
Dept: FAMILY MEDICINE CLINIC | Facility: CLINIC | Age: 61
End: 2021-04-05

## 2021-04-05 DIAGNOSIS — E55.9 VITAMIN D DEFICIENCY: Primary | ICD-10-CM

## 2021-04-05 NOTE — TELEPHONE ENCOUNTER
Looking over labs for Wednesday morning and just wondering if we are drawling a  cbc and tsh or we including t3 and t4 too?

## 2021-04-06 DIAGNOSIS — E03.8 OTHER SPECIFIED HYPOTHYROIDISM: Primary | ICD-10-CM

## 2021-04-06 DIAGNOSIS — D72.829 LEUKOCYTOSIS, UNSPECIFIED TYPE: ICD-10-CM

## 2021-04-06 NOTE — TELEPHONE ENCOUNTER
Anisha Looney MA 4/6/2021 8:01 AM EDT      ----- Message -----  From: Haris Raines  Sent: 4/5/2021 4:57 PM EDT  To: [facility]  Subject: Non-Urgent Medical Question     I'm coming in for a blood draw on Wednesday because you wanted to follow up on my WBC count  Is it too soon to check on my thyroid levels after the med switch at the beginning of March? Also, my [facility] healthcoach suggested getting a new number for my Vitamin D level  Can we do that as well? Thanks!     Hope you had a good Marci -  Chio Raines

## 2021-04-07 ENCOUNTER — CLINICAL SUPPORT (OUTPATIENT)
Dept: FAMILY MEDICINE CLINIC | Facility: CLINIC | Age: 61
End: 2021-04-07
Payer: COMMERCIAL

## 2021-04-07 DIAGNOSIS — D72.829 LEUKOCYTOSIS, UNSPECIFIED TYPE: ICD-10-CM

## 2021-04-07 DIAGNOSIS — E03.8 OTHER SPECIFIED HYPOTHYROIDISM: Primary | ICD-10-CM

## 2021-04-07 PROCEDURE — 36415 COLL VENOUS BLD VENIPUNCTURE: CPT

## 2021-04-08 LAB
25(OH)D3+25(OH)D2 SERPL-MCNC: 26.6 NG/ML (ref 30–100)
BASOPHILS # BLD AUTO: 0.1 X10E3/UL (ref 0–0.2)
BASOPHILS NFR BLD AUTO: 1 %
EOSINOPHIL # BLD AUTO: 0.2 X10E3/UL (ref 0–0.4)
EOSINOPHIL NFR BLD AUTO: 2 %
ERYTHROCYTE [DISTWIDTH] IN BLOOD BY AUTOMATED COUNT: 14.3 % (ref 11.7–15.4)
HCT VFR BLD AUTO: 34.8 % (ref 34–46.6)
HGB BLD-MCNC: 11.7 G/DL (ref 11.1–15.9)
IMM GRANULOCYTES # BLD: 0.1 X10E3/UL (ref 0–0.1)
IMM GRANULOCYTES NFR BLD: 1 %
LYMPHOCYTES # BLD AUTO: 2.2 X10E3/UL (ref 0.7–3.1)
LYMPHOCYTES NFR BLD AUTO: 19 %
MCH RBC QN AUTO: 29.2 PG (ref 26.6–33)
MCHC RBC AUTO-ENTMCNC: 33.6 G/DL (ref 31.5–35.7)
MCV RBC AUTO: 87 FL (ref 79–97)
MONOCYTES # BLD AUTO: 0.7 X10E3/UL (ref 0.1–0.9)
MONOCYTES NFR BLD AUTO: 6 %
NEUTROPHILS # BLD AUTO: 8.4 X10E3/UL (ref 1.4–7)
NEUTROPHILS NFR BLD AUTO: 71 %
PLATELET # BLD AUTO: 411 X10E3/UL (ref 150–450)
RBC # BLD AUTO: 4.01 X10E6/UL (ref 3.77–5.28)
T3FREE SERPL-MCNC: 2.4 PG/ML (ref 2–4.4)
T4 FREE SERPL-MCNC: 1.45 NG/DL (ref 0.82–1.77)
TSH SERPL DL<=0.005 MIU/L-ACNC: 2.56 UIU/ML (ref 0.45–4.5)
WBC # BLD AUTO: 11.7 X10E3/UL (ref 3.4–10.8)

## 2021-04-20 DIAGNOSIS — I10 BENIGN ESSENTIAL HTN: Primary | ICD-10-CM

## 2021-04-20 RX ORDER — LISINOPRIL 2.5 MG/1
2.5 TABLET ORAL 2 TIMES DAILY
Qty: 90 TABLET | Refills: 1 | Status: SHIPPED | OUTPATIENT
Start: 2021-04-20 | End: 2021-09-08

## 2021-04-23 ENCOUNTER — IMMUNIZATIONS (OUTPATIENT)
Dept: FAMILY MEDICINE CLINIC | Facility: HOSPITAL | Age: 61
End: 2021-04-23

## 2021-04-23 DIAGNOSIS — Z23 ENCOUNTER FOR IMMUNIZATION: Primary | ICD-10-CM

## 2021-04-23 PROCEDURE — 91300 SARS-COV-2 / COVID-19 MRNA VACCINE (PFIZER-BIONTECH) 30 MCG: CPT

## 2021-04-23 PROCEDURE — 0002A SARS-COV-2 / COVID-19 MRNA VACCINE (PFIZER-BIONTECH) 30 MCG: CPT

## 2021-04-29 DIAGNOSIS — E03.8 OTHER SPECIFIED HYPOTHYROIDISM: ICD-10-CM

## 2021-04-30 RX ORDER — LEVOTHYROXINE SODIUM 0.15 MG/1
150 TABLET ORAL
Qty: 30 TABLET | Refills: 2 | Status: SHIPPED | OUTPATIENT
Start: 2021-04-30 | End: 2021-06-24

## 2021-05-27 DIAGNOSIS — I10 WHITE COAT SYNDROME WITH DIAGNOSIS OF HYPERTENSION: ICD-10-CM

## 2021-05-27 RX ORDER — LISINOPRIL 5 MG/1
10 TABLET ORAL DAILY
Qty: 180 TABLET | Refills: 0 | Status: SHIPPED | OUTPATIENT
Start: 2021-05-27 | End: 2021-06-21 | Stop reason: SDUPTHER

## 2021-06-28 ENCOUNTER — PATIENT MESSAGE (OUTPATIENT)
Dept: FAMILY MEDICINE CLINIC | Facility: CLINIC | Age: 61
End: 2021-06-28

## 2021-06-28 DIAGNOSIS — F41.1 GENERALIZED ANXIETY DISORDER: Primary | ICD-10-CM

## 2021-06-29 RX ORDER — ALPRAZOLAM 0.5 MG/1
0.5 TABLET ORAL 3 TIMES DAILY
Qty: 30 TABLET | Refills: 0 | Status: SHIPPED | OUTPATIENT
Start: 2021-06-29 | End: 2021-11-30 | Stop reason: SDUPTHER

## 2021-06-29 NOTE — TELEPHONE ENCOUNTER
Marissa Wood MA 6/29/2021 7:31 AM EDT      ----- Message -----  From: Reagan Raines  Sent: 6/28/2021 11:15 PM EDT  To: [facility]  Subject: Prescription Question     Hello! Please, I need a refill of my ALPRAZolam 0 5 mg tablet from the [facility]  I wasn't permitted to request a refill through the [facility] medications page - it said I had to request via messages  I don't use this med often, perhaps 4 - 5 tables in a month, but it is very helpful when I have an anxiety attack and just knowing that it's there if I need it seems to lower my anxiety a good deal     Please call me at [phone] if you have any questions  Thanks      ADALBERTO Raines

## 2021-07-12 ENCOUNTER — OFFICE VISIT (OUTPATIENT)
Dept: URGENT CARE | Facility: CLINIC | Age: 61
End: 2021-07-12
Payer: COMMERCIAL

## 2021-07-12 VITALS
WEIGHT: 293 LBS | SYSTOLIC BLOOD PRESSURE: 168 MMHG | TEMPERATURE: 97.8 F | DIASTOLIC BLOOD PRESSURE: 100 MMHG | RESPIRATION RATE: 18 BRPM | HEART RATE: 91 BPM | OXYGEN SATURATION: 96 % | HEIGHT: 64 IN | BODY MASS INDEX: 50.02 KG/M2

## 2021-07-12 DIAGNOSIS — S81.831A PUNCTURE WOUND OF RIGHT LOWER LEG, INITIAL ENCOUNTER: Primary | ICD-10-CM

## 2021-07-12 PROCEDURE — 99213 OFFICE O/P EST LOW 20 MIN: CPT | Performed by: PHYSICIAN ASSISTANT

## 2021-07-12 RX ORDER — CEPHALEXIN 500 MG/1
500 CAPSULE ORAL EVERY 6 HOURS SCHEDULED
Qty: 20 CAPSULE | Refills: 0 | Status: SHIPPED | OUTPATIENT
Start: 2021-07-12 | End: 2021-07-17

## 2021-07-12 NOTE — PROGRESS NOTES
NAME: Abigail Pryor is a 63 y o  female  : 1960    MRN: [id number]      Assessment and Plan   Puncture wound of right lower leg, initial encounter [C46 401F]  1  Puncture wound of right lower leg, initial encounter  cephalexin (KEFLEX) 500 mg capsule       Discussed keeping area clean, applying antibiotic ointment, taking Keflex and monitoring for signs of worsening infection  If they occur go to the ER or follow up with PCP  Patient aware of elevated blood pressure  States she has severe white coat syndrome and denies any chest pain, shortness of breath or dizziness  Patient Instructions     Patient Instructions   Keflex as directed  Keep area clean and dry   Antibiotic ointment daily   If no improvement f/u with PCP   If anything changes or worsens f/u sooner or go to the ER    Proceed to ER if symptoms worsen  Chief Complaint     Chief Complaint   Patient presents with    Wound Infection     right calf has wounds on from a roaster biting it on saturday         History of Present Illness    Patient with history of asthma and hypertension presents complaining of puncture wound right lower leg  Reports on Saturday 1 of her roosters  Became angry and packed her right lower leg  Reports she has 2 small puncture wounds to the outside of her right calf  Reports she has been doing all the proper 1st aid precautions such as washing it and using antibiotic ointment  Reports the 1 area looks a little red today and wanted to make sure there is no infection  Denies any fevers, chills, body aches, nausea or vomiting  Reports small amount of purulent fluid from the 1 wound today  Tetanus updated in January       Review of Systems   Review of Systems   Constitutional: Negative for chills and fever  Gastrointestinal: Negative for diarrhea, nausea and vomiting  Musculoskeletal: Negative for myalgias  Skin: Positive for wound  Neurological: Negative for weakness and numbness           Current Medications       Current Outpatient Medications:     Albuterol Sulfate 108 (90 Base) MCG/ACT AEPB, Inhale 1 puff, Disp: , Rfl:     ALPRAZolam (Xanax) 0 5 mg tablet, Take 1 tablet (0 5 mg total) by mouth 3 (three) times a day, Disp: 30 tablet, Rfl: 0    Ascorbic Acid (VITAMIN C PO), Vitamin C, Disp: , Rfl:     Ascorbic Acid 500 MG CAPS, Take 1,000 mg by mouth daily, Disp: , Rfl:     Cholecalciferol (Vitamin D3) 50 MCG (2000 UT) capsule, Vitamin D3 50 mcg (2,000 unit) capsule  Take by oral route , Disp: , Rfl:     levothyroxine 150 mcg tablet, Take 1 tablet (150 mcg total) by mouth daily in the early morning, Disp: 30 tablet, Rfl: 2    lisinopril (ZESTRIL) 2 5 mg tablet, Take 1 tablet (2 5 mg total) by mouth 2 (two) times a day, Disp: 90 tablet, Rfl: 1    lisinopril (ZESTRIL) 5 mg tablet, Take 2 tablets (10 mg total) by mouth daily, Disp: 180 tablet, Rfl: 0    Symbicort 160-4 5 MCG/ACT inhaler, Inhale 1 puff daily, Disp: , Rfl:     cephalexin (KEFLEX) 500 mg capsule, Take 1 capsule (500 mg total) by mouth every 6 (six) hours for 5 days, Disp: 20 capsule, Rfl: 0    estradiol (Estring) 2 MG vaginal ring, Estring 2 mg (7 5 mcg/24 hour) vaginal ring  INSERT 1 VAGINAL RING(S) BY VAGINAL ROUTE FOR 90 DAYS  (Patient not taking: Reported on 7/12/2021), Disp: , Rfl:     Current Allergies     Allergies as of 07/12/2021 - Reviewed 07/12/2021   Allergen Reaction Noted    Dust mite extract Cough 12/08/2020    Molds & smuts Cough 12/08/2020    Pollen extract Cough 06/15/2020              Past Medical History:   Diagnosis Date    Allergic     pollen    Anxiety     Asthma     sleep apnea    Disease of thyroid gland     Hypertension        Past Surgical History:   Procedure Laterality Date    ANKLE SURGERY      s/p bone graft to talus due to talar cyst       Family History   Problem Relation Age of Onset    Diabetes Mother     Stroke Mother     Cancer Father          Medications have been verified      The following portions of the patient's history were reviewed and updated as appropriate: allergies, current medications, past family history, past medical history, past social history, past surgical history and problem list     Objective   /100   Pulse 91   Temp 97 8 °F (36 6 °C)   Resp 18   Ht 5' 4" (1 626 m)   Wt (!) 137 kg (301 lb)   SpO2 96%   BMI 51 67 kg/m²      Physical Exam     Physical Exam  Vitals and nursing note reviewed  Constitutional:       General: She is not in acute distress  Appearance: Normal appearance  She is not ill-appearing, toxic-appearing or diaphoretic  Cardiovascular:      Rate and Rhythm: Normal rate and regular rhythm  Pulmonary:      Effort: Pulmonary effort is normal  No respiratory distress  Skin:     Comments: Two small superficial, already scabbed over puncture wounds to the right lateral calf  More proximal wound is healing well without signs of infection  The distal wound is mildly erythematous surrounding with some slight induration  No edema  No abscess  No foreign bodies  Mildly tender to palpation  No lymphangitis  Full sensation to light touch  Cap refill less than 2 seconds  Neurological:      Mental Status: She is alert

## 2021-07-12 NOTE — PATIENT INSTRUCTIONS
Keflex as directed  Keep area clean and dry   Antibiotic ointment daily   If no improvement f/u with PCP   If anything changes or worsens f/u sooner or go to the ER

## 2021-09-30 DIAGNOSIS — I10 BENIGN ESSENTIAL HTN: ICD-10-CM

## 2021-10-01 RX ORDER — LISINOPRIL 2.5 MG/1
TABLET ORAL
Qty: 60 TABLET | Refills: 0 | Status: SHIPPED | OUTPATIENT
Start: 2021-10-01 | End: 2021-10-15 | Stop reason: SDUPTHER

## 2021-10-15 DIAGNOSIS — I10 WHITE COAT SYNDROME WITH DIAGNOSIS OF HYPERTENSION: ICD-10-CM

## 2021-10-15 DIAGNOSIS — I10 BENIGN ESSENTIAL HTN: ICD-10-CM

## 2021-10-15 RX ORDER — LISINOPRIL 2.5 MG/1
2.5 TABLET ORAL 2 TIMES DAILY
Qty: 60 TABLET | Refills: 0 | Status: SHIPPED | OUTPATIENT
Start: 2021-10-15 | End: 2021-11-02 | Stop reason: SDUPTHER

## 2021-10-15 RX ORDER — LISINOPRIL 5 MG/1
10 TABLET ORAL DAILY
Qty: 180 TABLET | Refills: 0 | Status: SHIPPED | OUTPATIENT
Start: 2021-10-15 | End: 2021-11-02 | Stop reason: SDUPTHER

## 2021-10-28 DIAGNOSIS — I10 BENIGN ESSENTIAL HTN: ICD-10-CM

## 2021-10-28 RX ORDER — LISINOPRIL 2.5 MG/1
2.5 TABLET ORAL 2 TIMES DAILY
Qty: 60 TABLET | Refills: 0 | Status: CANCELLED | OUTPATIENT
Start: 2021-10-28

## 2021-11-02 DIAGNOSIS — I10 BENIGN ESSENTIAL HTN: ICD-10-CM

## 2021-11-02 DIAGNOSIS — I10 WHITE COAT SYNDROME WITH DIAGNOSIS OF HYPERTENSION: ICD-10-CM

## 2021-11-02 RX ORDER — LISINOPRIL 2.5 MG/1
2.5 TABLET ORAL 2 TIMES DAILY
Qty: 60 TABLET | Refills: 0 | Status: SHIPPED | OUTPATIENT
Start: 2021-11-02 | End: 2021-11-30 | Stop reason: ALTCHOICE

## 2021-11-02 RX ORDER — LISINOPRIL 5 MG/1
10 TABLET ORAL DAILY
Qty: 180 TABLET | Refills: 0 | Status: SHIPPED | OUTPATIENT
Start: 2021-11-02 | End: 2021-11-30 | Stop reason: ALTCHOICE

## 2021-11-30 ENCOUNTER — OFFICE VISIT (OUTPATIENT)
Dept: FAMILY MEDICINE CLINIC | Facility: CLINIC | Age: 61
End: 2021-11-30
Payer: COMMERCIAL

## 2021-11-30 VITALS
WEIGHT: 293 LBS | OXYGEN SATURATION: 100 % | HEIGHT: 64 IN | TEMPERATURE: 98.4 F | HEART RATE: 98 BPM | DIASTOLIC BLOOD PRESSURE: 90 MMHG | SYSTOLIC BLOOD PRESSURE: 140 MMHG | RESPIRATION RATE: 20 BRPM | BODY MASS INDEX: 50.02 KG/M2

## 2021-11-30 DIAGNOSIS — Z13.220 LIPID SCREENING: ICD-10-CM

## 2021-11-30 DIAGNOSIS — M17.11 PRIMARY OSTEOARTHRITIS OF RIGHT KNEE: ICD-10-CM

## 2021-11-30 DIAGNOSIS — Z76.89 ENCOUNTER TO ESTABLISH CARE: Primary | ICD-10-CM

## 2021-11-30 DIAGNOSIS — G47.33 OBSTRUCTIVE SLEEP APNEA: ICD-10-CM

## 2021-11-30 DIAGNOSIS — Z12.31 BREAST CANCER SCREENING BY MAMMOGRAM: ICD-10-CM

## 2021-11-30 DIAGNOSIS — F41.1 GENERALIZED ANXIETY DISORDER: ICD-10-CM

## 2021-11-30 DIAGNOSIS — I10 BENIGN ESSENTIAL HTN: ICD-10-CM

## 2021-11-30 DIAGNOSIS — J45.30 MILD PERSISTENT ASTHMA WITHOUT COMPLICATION: ICD-10-CM

## 2021-11-30 DIAGNOSIS — E55.9 HYPOVITAMINOSIS D: ICD-10-CM

## 2021-11-30 DIAGNOSIS — E03.8 OTHER SPECIFIED HYPOTHYROIDISM: ICD-10-CM

## 2021-11-30 PROCEDURE — 99214 OFFICE O/P EST MOD 30 MIN: CPT | Performed by: NURSE PRACTITIONER

## 2021-11-30 RX ORDER — DIAZEPAM 10 MG/1
TABLET ORAL
COMMUNITY
Start: 2021-11-19

## 2021-11-30 RX ORDER — BUDESONIDE AND FORMOTEROL FUMARATE DIHYDRATE 80; 4.5 UG/1; UG/1
2 AEROSOL RESPIRATORY (INHALATION) 2 TIMES DAILY
COMMUNITY
End: 2022-04-13

## 2021-11-30 RX ORDER — ALPRAZOLAM 0.5 MG/1
0.5 TABLET ORAL DAILY PRN
Qty: 30 TABLET | Refills: 0 | Status: SHIPPED | OUTPATIENT
Start: 2021-11-30

## 2021-11-30 RX ORDER — LISINOPRIL 10 MG/1
10 TABLET ORAL 2 TIMES DAILY
Qty: 180 TABLET | Refills: 1 | Status: SHIPPED | OUTPATIENT
Start: 2021-11-30 | End: 2022-05-29

## 2021-12-01 ENCOUNTER — TELEPHONE (OUTPATIENT)
Dept: ADMINISTRATIVE | Facility: OTHER | Age: 61
End: 2021-12-01

## 2021-12-14 ENCOUNTER — TELEPHONE (OUTPATIENT)
Dept: FAMILY MEDICINE CLINIC | Facility: CLINIC | Age: 61
End: 2021-12-14

## 2021-12-14 DIAGNOSIS — E03.8 OTHER SPECIFIED HYPOTHYROIDISM: ICD-10-CM

## 2021-12-14 RX ORDER — LEVOTHYROXINE SODIUM 0.15 MG/1
150 TABLET ORAL
Qty: 30 TABLET | Refills: 1 | Status: SHIPPED | OUTPATIENT
Start: 2021-12-14 | End: 2022-02-08

## 2021-12-21 ENCOUNTER — HOSPITAL ENCOUNTER (OUTPATIENT)
Dept: RADIOLOGY | Facility: HOSPITAL | Age: 61
Discharge: HOME | End: 2021-12-21
Attending: ORTHOPAEDIC SURGERY
Payer: COMMERCIAL

## 2021-12-21 ENCOUNTER — OFFICE VISIT (OUTPATIENT)
Dept: OBGYN CLINIC | Facility: CLINIC | Age: 61
End: 2021-12-21
Payer: COMMERCIAL

## 2021-12-21 VITALS
HEART RATE: 60 BPM | SYSTOLIC BLOOD PRESSURE: 132 MMHG | WEIGHT: 293 LBS | BODY MASS INDEX: 50.02 KG/M2 | HEIGHT: 64 IN | DIASTOLIC BLOOD PRESSURE: 85 MMHG

## 2021-12-21 DIAGNOSIS — M25.561 RIGHT KNEE PAIN, UNSPECIFIED CHRONICITY: ICD-10-CM

## 2021-12-21 DIAGNOSIS — M17.11 PRIMARY OSTEOARTHRITIS OF RIGHT KNEE: Primary | ICD-10-CM

## 2021-12-21 PROCEDURE — 73562 X-RAY EXAM OF KNEE 3: CPT

## 2021-12-21 PROCEDURE — 99204 OFFICE O/P NEW MOD 45 MIN: CPT | Performed by: ORTHOPAEDIC SURGERY

## 2021-12-21 PROCEDURE — 1036F TOBACCO NON-USER: CPT | Performed by: ORTHOPAEDIC SURGERY

## 2021-12-21 PROCEDURE — 3008F BODY MASS INDEX DOCD: CPT | Performed by: ORTHOPAEDIC SURGERY

## 2021-12-21 RX ORDER — MELOXICAM 15 MG/1
15 TABLET ORAL DAILY
Qty: 30 TABLET | Refills: 0 | Status: SHIPPED | OUTPATIENT
Start: 2021-12-21 | End: 2022-01-04

## 2021-12-21 RX ORDER — TRAMADOL HYDROCHLORIDE 50 MG/1
50 TABLET ORAL EVERY 8 HOURS PRN
Qty: 30 TABLET | Refills: 0 | Status: SHIPPED | OUTPATIENT
Start: 2021-12-21 | End: 2022-01-18 | Stop reason: SDUPTHER

## 2022-01-25 ENCOUNTER — TELEPHONE (OUTPATIENT)
Dept: OBGYN CLINIC | Facility: CLINIC | Age: 62
End: 2022-01-25

## 2022-01-25 NOTE — TELEPHONE ENCOUNTER
Called and Left VM for patient to call back to get a sooner appointment scheduled  Current there is openings for 2/8/22       ----- Message from Leonid Douglas PA-C sent at 1/25/2022  4:26 PM EST -----  Regarding: FW: pain management  She may need a sooner appt for evaluation            ----- Message -----  From: Layne Phoenix MA  Sent: 1/25/2022   8:31 AM EST  To: Oskar Vu MD  Subject: FW: pain management                                ----- Message -----  From: Vinnie Raines  Sent: 1/24/2022   6:37 PM EST  To: [facility]  Subject: pain management                                  Hello -   I am continuing to take the Meloxicam 15 mg daily  (my stomach issues resolved) and the Tramadol  50 mg  - which I try not to take more than once per day and not every day  The problem is that the right knee pain is increasing - waking me up several times a night and occurring when I am doing my no-impact exercises in the [employer] swimming pool  The pain meds worked well in the first few weeks but less so now  I am doing the VMO exercises, though not with an ankle weight because of this pain increase  Can you recommend any changes in the pain meds? The waking at night and hurting during exercise are new this month  Please ring me at [phone] if you have any  ? or suggestions      thanks, Sharon Raines

## 2022-02-08 DIAGNOSIS — E03.8 OTHER SPECIFIED HYPOTHYROIDISM: ICD-10-CM

## 2022-02-08 RX ORDER — LEVOTHYROXINE SODIUM 0.15 MG/1
150 TABLET ORAL
Qty: 30 TABLET | Refills: 1 | Status: SHIPPED | OUTPATIENT
Start: 2022-02-08 | End: 2022-04-01

## 2022-02-09 ENCOUNTER — OFFICE VISIT (OUTPATIENT)
Dept: OBGYN CLINIC | Facility: CLINIC | Age: 62
End: 2022-02-09
Payer: COMMERCIAL

## 2022-02-09 VITALS — WEIGHT: 288 LBS | BODY MASS INDEX: 49.17 KG/M2 | HEIGHT: 64 IN

## 2022-02-09 DIAGNOSIS — M17.11 PRIMARY OSTEOARTHRITIS OF RIGHT KNEE: Primary | ICD-10-CM

## 2022-02-09 PROCEDURE — 3008F BODY MASS INDEX DOCD: CPT | Performed by: ORTHOPAEDIC SURGERY

## 2022-02-09 PROCEDURE — 1036F TOBACCO NON-USER: CPT | Performed by: ORTHOPAEDIC SURGERY

## 2022-02-09 PROCEDURE — 99214 OFFICE O/P EST MOD 30 MIN: CPT | Performed by: ORTHOPAEDIC SURGERY

## 2022-02-09 PROCEDURE — 20610 DRAIN/INJ JOINT/BURSA W/O US: CPT | Performed by: ORTHOPAEDIC SURGERY

## 2022-02-09 RX ORDER — BUPIVACAINE HYDROCHLORIDE 2.5 MG/ML
2 INJECTION, SOLUTION INFILTRATION; PERINEURAL
Status: COMPLETED | OUTPATIENT
Start: 2022-02-09 | End: 2022-02-09

## 2022-02-09 RX ORDER — METHYLPREDNISOLONE ACETATE 80 MG/ML
1 INJECTION, SUSPENSION INTRA-ARTICULAR; INTRALESIONAL; INTRAMUSCULAR; SOFT TISSUE
Status: COMPLETED | OUTPATIENT
Start: 2022-02-09 | End: 2022-02-09

## 2022-02-09 RX ORDER — CELECOXIB 200 MG/1
200 CAPSULE ORAL DAILY
Qty: 30 CAPSULE | Refills: 0 | Status: SHIPPED | OUTPATIENT
Start: 2022-02-09 | End: 2022-03-02 | Stop reason: ALTCHOICE

## 2022-02-09 RX ADMIN — METHYLPREDNISOLONE ACETATE 1 ML: 80 INJECTION, SUSPENSION INTRA-ARTICULAR; INTRALESIONAL; INTRAMUSCULAR; SOFT TISSUE at 13:34

## 2022-02-09 RX ADMIN — BUPIVACAINE HYDROCHLORIDE 2 ML: 2.5 INJECTION, SOLUTION INFILTRATION; PERINEURAL at 13:34

## 2022-02-09 NOTE — PROGRESS NOTES
Assessment:  1  Primary osteoarthritis of right knee  Large joint arthrocentesis: R knee       Plan:     Patient has chronic right knee pain due to severe osteoarthritis   Weight bearing as tolerated right lower extremity   Patient received a right knee steroid injection with Toradol in the office today   Referred patient to pain management for pain control    Patient is currently not a surgical candidate due to her BMI being  Patient is aware her BMI needs to be 39   She has been working on weight loss with diet control and exercises  Her goal is lose 40 lbs     Stop taking the Meloxicam 15 mg    Prescribed patient Celebrex 200 mg as needed for pain    Follow up 3 months         The above stated was discussed in layman's terms and the patient expressed understanding  All questions were answered to the patient's satisfaction  Subjective:   Arleth Raines is a 57 y o  female who presents follow up for right knee pain due to severe osteoarthritis  She has been working on weight loss and has lost 6 lb since last office visit  She states she is doing aqua aerobics and monitoring her diet  States she continues to have pain over the anterior and medial aspect of the right knee  She states in January she started having pain in nine has trouble sleeping  She has been taking Meloxicam 15 mg and Tramadol 50 mg for pain and states the medications initially helped her for two weeks but now it is helping minimally  Review of systems negative unless otherwise specified in HPI    Past Medical History:   Diagnosis Date    Allergic     pollen    Anxiety     Arthritis approx   2015    right knee    Asthma     sleep apnea    Disease of thyroid gland     Hypertension     Obesity lifelong       Past Surgical History:   Procedure Laterality Date    ANKLE SURGERY      s/p bone graft to talus due to talar cyst       Family History   Problem Relation Age of Onset    Mental illness Mother Borderline personality disorder    Diabetes Mother     Stroke Mother     Eating disorder Mother     Hypertension Mother     Psychiatric Illness Mother         BPD    Cancer Father         penile cancer    Hypertension Father     No Known Problems Brother     Eating disorder Maternal Grandmother     Glaucoma Maternal Grandmother        Social History     Occupational History    Not on file   Tobacco Use    Smoking status: Never Smoker    Smokeless tobacco: Never Used   Vaping Use    Vaping Use: Never used   Substance and Sexual Activity    Alcohol use:  Yes     Alcohol/week: 8 0 standard drinks     Types: 4 Glasses of wine, 4 Standard drinks or equivalent per week    Drug use: Not Currently     Types: Amphetamines, Barbiturates, Hashish, Marijuana     Comment: Late 70's, early [de-identified] - stopped on my own    Sexual activity: Not Currently     Partners: Male     Birth control/protection: Post-menopausal         Current Outpatient Medications:     Albuterol Sulfate 108 (90 Base) MCG/ACT AEPB, Inhale 1 puff, Disp: , Rfl:     ALPRAZolam (Xanax) 0 5 mg tablet, Take 1 tablet (0 5 mg total) by mouth daily as needed for anxiety, Disp: 30 tablet, Rfl: 0    Ascorbic Acid 500 MG CAPS, Take 2,000 mg by mouth daily  , Disp: , Rfl:     Cholecalciferol (Vitamin D3) 50 MCG (2000 UT) capsule, 4,000 Units  , Disp: , Rfl:     diazepam (VALIUM) 10 mg tablet, Uses once only prior to dental procedures , Disp: , Rfl:     levothyroxine 150 mcg tablet, TAKE 1 TABLET (150 MCG TOTAL) BY MOUTH DAILY IN THE EARLY MORNING, Disp: 30 tablet, Rfl: 1    lisinopril (ZESTRIL) 10 mg tablet, Take 1 tablet (10 mg total) by mouth 2 (two) times a day, Disp: 180 tablet, Rfl: 1    meloxicam (Mobic) 15 mg tablet, Take 1 tablet (15 mg total) by mouth daily, Disp: 30 tablet, Rfl: 1    traMADol (Ultram) 50 mg tablet, Take 1 tablet (50 mg total) by mouth every 8 (eight) hours as needed for moderate pain, Disp: 20 tablet, Rfl: 0    triazolam (HALCION) 0 25 MG tablet, 0 25 mg Uses just prior to dental procedures , Disp: , Rfl:     budesonide-formoterol (SYMBICORT) 80-4 5 MCG/ACT inhaler, Inhale 2 puffs 2 (two) times a day Rinse mouth after use , Disp: , Rfl:     Allergies   Allergen Reactions    Dust Mite Extract Cough    Molds & Smuts Cough    Pollen Extract Cough            Vitals:       Objective:            Physical Exam  Physical Exam:      General Appearance:    Alert, cooperative, no distress, appears stated age   Head:    Normocephalic, without obvious abnormality, atraumatic   Eyes:    conjunctiva/corneas clear, both eyes         Nose:   Nares normal, septum midline, no drainage    Throat:   Lips normal; teeth and gums normal   Neck:    symmetrical, trachea midline, ;     thyroid:  no enlargement/   Back:     Symmetric, no curvature, ROM normal   Lungs:   No audible wheezing or labored breathing   Chest Wall:    No tenderness or deformity    Heart:    Regular rate and rhythm                         Pulses:   2+ and symmetric all extremities   Skin:   Skin color, texture, turgor normal, no rashes or lesions   Neurologic:   normal strength, sensation and reflexes     throughout                       Ortho Exam     Right knee  Skin intact  No erythema  ROM: 0-120   Stable to varus and valgus stress      TTP over medial joint line and patella   Neurovascularly Intact Distally     Diagnostics, reviewed and taken today if performed as documented:    None performed        Procedures, if performed today:    Large joint arthrocentesis: R knee  Universal Protocol:  Consent: Verbal consent obtained  Risks and benefits: risks, benefits and alternatives were discussed  Consent given by: patient  Time out: Immediately prior to procedure a "time out" was called to verify the correct patient, procedure, equipment, support staff and site/side marked as required    Timeout called at: 2/9/2022 1:33 PM   Patient understanding: patient states understanding of the procedure being performed  Site marked: the operative site was marked  Supporting Documentation  Indications: pain   Procedure Details  Location: knee - R knee (1ml Toradol )  Preparation: Patient was prepped and draped in the usual sterile fashion  Needle size: 22 G  Ultrasound guidance: no  Approach: anterolateral  Medications administered: 2 mL bupivacaine 0 25 %; 1 mL methylPREDNISolone acetate 80 mg/mL    Patient tolerance: patient tolerated the procedure well with no immediate complications  Dressing:  Sterile dressing applied              Scribe Attestation    I,:  Jania Rodrigez am acting as a scribe while in the presence of the attending physician :       I,:  Michele Esquivel MD personally performed the services described in this documentation    as scribed in my presence :             Portions of the record may have been created with voice recognition software  Occasional wrong word or "sound a like" substitutions may have occurred due to the inherent limitations of voice recognition software  Read the chart carefully and recognize, using context, where substitutions have occurred

## 2022-02-11 ENCOUNTER — TELEPHONE (OUTPATIENT)
Dept: PAIN MEDICINE | Facility: CLINIC | Age: 62
End: 2022-02-11

## 2022-03-02 ENCOUNTER — CONSULT (OUTPATIENT)
Dept: PAIN MEDICINE | Facility: CLINIC | Age: 62
End: 2022-03-02
Payer: COMMERCIAL

## 2022-03-02 VITALS
HEART RATE: 82 BPM | SYSTOLIC BLOOD PRESSURE: 180 MMHG | BODY MASS INDEX: 48.65 KG/M2 | WEIGHT: 285 LBS | HEIGHT: 64 IN | DIASTOLIC BLOOD PRESSURE: 98 MMHG | TEMPERATURE: 98.2 F

## 2022-03-02 DIAGNOSIS — G89.4 CHRONIC PAIN SYNDROME: ICD-10-CM

## 2022-03-02 DIAGNOSIS — M79.2 NEUROPATHIC PAIN: Primary | ICD-10-CM

## 2022-03-02 DIAGNOSIS — M17.11 PRIMARY OSTEOARTHRITIS OF RIGHT KNEE: ICD-10-CM

## 2022-03-02 PROCEDURE — 99204 OFFICE O/P NEW MOD 45 MIN: CPT | Performed by: ANESTHESIOLOGY

## 2022-03-02 PROCEDURE — 3008F BODY MASS INDEX DOCD: CPT | Performed by: ANESTHESIOLOGY

## 2022-03-02 PROCEDURE — 1036F TOBACCO NON-USER: CPT | Performed by: ANESTHESIOLOGY

## 2022-03-02 RX ORDER — DICLOFENAC SODIUM 75 MG/1
75 TABLET, DELAYED RELEASE ORAL 2 TIMES DAILY
Qty: 60 TABLET | Refills: 1 | Status: SHIPPED | OUTPATIENT
Start: 2022-03-02 | End: 2022-04-13 | Stop reason: SDUPTHER

## 2022-03-02 RX ORDER — GABAPENTIN 100 MG/1
CAPSULE ORAL
Qty: 90 CAPSULE | Refills: 0 | Status: SHIPPED | OUTPATIENT
Start: 2022-03-02 | End: 2022-04-05 | Stop reason: SDUPTHER

## 2022-03-02 NOTE — PATIENT INSTRUCTIONS
Chronic Pain   AMBULATORY CARE:   Chronic pain  is pain that does not get better for 3 months or longer  Chronic pain may hurt all the time, or come and go  Call your local emergency number or have someone else call (911 in the [address]) if:   · You are breathing slower than normal, or you have trouble breathing  · You cannot be awakened  · You have a seizure  Call your doctor if:   · Your heart feels like it is jumping or fluttering  · You cannot think clearly  · You have side effects from prescription pain medicine, such as itching, nausea, or vomiting  · You have trouble sleeping  · Your pain gets worse, even after you take medicine  · You don't think the medicine is working  · You have questions or concerns about your condition or care  Treatment for chronic pain  may need any of the following:  · Acetaminophen  decreases pain and fever  It is available without a doctor's order  Ask how much to take and how often to take it  Follow directions  Read the labels of all other medicines you are using to see if they also contain acetaminophen, or ask your doctor or pharmacist  Acetaminophen can cause liver damage if not taken correctly  Do not use more than 4 grams (4,000 milligrams) total of acetaminophen in one day  · NSAIDs , such as ibuprofen, help decrease swelling, pain, and fever  This medicine is available with or without a doctor's order  NSAIDs can cause stomach bleeding or kidney problems in certain people  If you take blood thinner medicine, always ask your healthcare provider if NSAIDs are safe for you  Always read the medicine label and follow directions  · Prescription pain medicine  called narcotics or opioids may be given for certain types of chronic pain  Ask your healthcare provider how to take this medicine safely  · Anesthetics  can be rubbed on your skin or injected into a nerve or muscle to numb an area      · Other medicines  may reduce pain, anxiety, muscle tension, or swelling  Manage your chronic pain:   · Apply heat  on the area in pain for 20 to 30 minutes every 2 hours for as many days as directed  Heat helps decrease pain and muscle spasms  · Apply ice  on the part of your body that hurts for 15 to 20 minutes every hour or as directed  Use an ice pack, or put crushed ice in a plastic bag  Cover it with a towel  Ice decreases pain and swelling, and helps prevent tissue damage  · Go to physical therapy as directed  A physical therapist teaches you exercises to help improve movement and strength, and to decrease pain  · Exercise for 30 minutes, 3 times a week  Regular physical activity can help decrease pain and improve your quality of life  Ask your healthcare provider about the best exercise plan for your type of pain  · Get enough sleep  Create a relaxing bedtime routine  Go to sleep and wake up at the same time every day  Avoid caffeine in the afternoon  · Talk with a counselor or therapist   A type of counseling called cognitive behavioral therapy (CBT) can help your chronic pain by changing the way you think about it  CBT can also improve your mood, sleep, and ability to move  What you must know if you take narcotic pain medicine:   · You may need to take a bowel movement softener  The most common side effect of prescription pain medicine is constipation  Bowel movement softeners are available over the counter  · Do not mix prescription pain medicines  This can cause an overdose of medicine, which can become life-threatening  Read labels  Make sure you know the ingredients in all of your medicines  · Do not drink alcohol  when you take prescription pain medicine  It is not safe to mix narcotics or opioids with alcohol or illegal drugs  · Prescription pain medicine may impair your ability to drive or work safely  They may also cause dizziness and increase your risk for falling       · Store prescription pain medicine in a safe location at home  Keep your medicine away from children and other people  Never share your medicine with anyone  Follow up with your healthcare provider as directed: You may be referred to a pain specialist  Write down your questions so you remember to ask them during your visits  © Copyright [employer] 2022 Information is for End User's use only and may not be sold, redistributed or otherwise used for commercial purposes  All illustrations and images included in CareNotes® are the copyrighted property of [employer]  or [employer]   The above information is an  only  It is not intended as medical advice for individual conditions or treatments  Talk to your doctor, nurse or pharmacist before following any medical regimen to see if it is safe and effective for you

## 2022-03-02 NOTE — PROGRESS NOTES
Assessment  1  Neuropathic pain    2  Primary osteoarthritis of right knee    3  Chronic pain syndrome        Plan    Patient presents to review options for her chronic knee pain as she is currently not a surgical candidate until she lowers her BMI  I explained to not feel that tramadol is a good long-term solution for oral medications and her pain as it is in the opioid class of medications and comes with called the potential side effects  She currently feels the meloxicam on the Celebrex is not providing any relief so we discontinued his medications start her on diclofenac 75 mg one tab twice daily  I did review the potential side effects of nonsteroidal anti-inflammatories if she has any problems or questions will discontinue the medication as well as call our office  Also recommended taking a over-the-counter H2 blocker  We did have discussion regarding geniculate nerve blocks and radiofrequency of the genicular nerve  She will take this under advisement and information was provided for home review  Follow-up will be scheduled in approximately 3-4 weeks  My impressions and treatment recommendations were discussed in detail with the patient who verbalized understanding and had no further questions  Discharge instructions were provided  I personally saw and examined the patient and I agree with the above discussed plan of care  New Medications Ordered This Visit   Medications    gabapentin (NEURONTIN) 100 mg capsule     Sig: Start with 1 pill at bedtime for 5 days then 1 pill twice a day for 5 days then 1 pill 3 times a day     Dispense:  90 capsule     Refill:  0    diclofenac (VOLTAREN) 75 mg EC tablet     Sig: Take 1 tablet (75 mg total) by mouth 2 (two) times a day     Dispense:  60 tablet     Refill:  1     Referred By: Michele Esquivel MD    History of Present Illness    Chio Raines is a 57 y o  female with chronic right knee pain secondary to osteoarthritis    She is currently not considered a surgical candidate secondary to her BMI  She is working to decrease her weight  Pain is severe when walking and is nearly constant and significant interferes with daily living activities  His worse in the morning describes sharp grabbing and numbing occasionally use a cane for ambulation  Lying down and sitting decreases symptoms will standing and bending aggravate her pain  Physical therapy and exercise provided moderate relief  I have personally reviewed and/or updated the patient's past medical history, past surgical history, family history, social history, current medications, allergies, and vital signs today  Review of Systems   Constitutional: Positive for unexpected weight change  Negative for fever  HENT: Negative for trouble swallowing  Eyes: Negative for visual disturbance  Respiratory: Negative for shortness of breath and wheezing  Cardiovascular: Negative for chest pain and palpitations  Gastrointestinal: Negative for constipation, diarrhea, nausea and vomiting  Endocrine: Negative for cold intolerance, heat intolerance and polydipsia  Genitourinary: Negative for difficulty urinating and frequency  Musculoskeletal: Positive for arthralgias  Negative for gait problem, joint swelling and myalgias  Skin: Negative for rash  Neurological: Negative for dizziness, seizures, syncope, weakness and headaches  Hematological: Does not bruise/bleed easily  Psychiatric/Behavioral: Positive for dysphoric mood  All other systems reviewed and are negative        Patient Active Problem List   Diagnosis    Mild persistent asthma without complication    Other specified hypothyroidism    Generalized anxiety disorder    Benign essential HTN    Primary osteoarthritis of right knee    White coat syndrome with diagnosis of hypertension    Obstructive sleep apnea       Past Medical History:   Diagnosis Date    Allergic     pollen    Anxiety     Arthritis approx  2015    right knee    Asthma     sleep apnea    Disease of thyroid gland     Hypertension     Obesity lifelong       Past Surgical History:   Procedure Laterality Date    ANKLE SURGERY      s/p bone graft to talus due to talar cyst       Family History   Problem Relation Age of Onset    Mental illness Mother         Borderline personality disorder    Diabetes Mother     Stroke Mother     Eating disorder Mother     Hypertension Mother     Psychiatric Illness Mother         BPD    Cancer Father         penile cancer    Hypertension Father     No Known Problems Brother     Eating disorder Maternal Grandmother     Glaucoma Maternal Grandmother        Social History     Occupational History    Not on file   Tobacco Use    Smoking status: Never Smoker    Smokeless tobacco: Never Used   Vaping Use    Vaping Use: Never used   Substance and Sexual Activity    Alcohol use:  Yes     Alcohol/week: 8 0 standard drinks     Types: 4 Glasses of wine, 4 Standard drinks or equivalent per week    Drug use: Not Currently     Types: Amphetamines, Barbiturates, Hashish, Marijuana     Comment: Late 70's, early [de-identified] - stopped on my own    Sexual activity: Not Currently     Partners: Male     Birth control/protection: Post-menopausal       Current Outpatient Medications on File Prior to Visit   Medication Sig    Albuterol Sulfate 108 (90 Base) MCG/ACT AEPB Inhale 1 puff    ALPRAZolam (Xanax) 0 5 mg tablet Take 1 tablet (0 5 mg total) by mouth daily as needed for anxiety    Ascorbic Acid 500 MG CAPS Take 2,000 mg by mouth daily      Cholecalciferol (Vitamin D3) 50 MCG (2000 UT) capsule 4,000 Units      diazepam (VALIUM) 10 mg tablet Uses once only prior to dental procedures     levothyroxine 150 mcg tablet TAKE 1 TABLET (150 MCG TOTAL) BY MOUTH DAILY IN THE EARLY MORNING    lisinopril (ZESTRIL) 10 mg tablet Take 1 tablet (10 mg total) by mouth 2 (two) times a day    traMADol (Ultram) 50 mg tablet Take 1 tablet (50 mg total) by mouth every 8 (eight) hours as needed for moderate pain    triazolam (HALCION) 0 25 MG tablet 0 25 mg Uses just prior to dental procedures     [DISCONTINUED] celecoxib (CeleBREX) 200 mg capsule Take 1 capsule (200 mg total) by mouth daily    budesonide-formoterol (SYMBICORT) 80-4 5 MCG/ACT inhaler Inhale 2 puffs 2 (two) times a day Rinse mouth after use   traMADol (ULTRAM) 50 mg tablet Take 1 tablet (50 mg total) by mouth every 6 (six) hours as needed for severe pain (Patient not taking: Reported on 3/2/2022 )    [DISCONTINUED] meloxicam (Mobic) 15 mg tablet Take 1 tablet (15 mg total) by mouth daily (Patient not taking: Reported on 3/2/2022 )     No current facility-administered medications on file prior to visit  Allergies   Allergen Reactions    Dust Mite Extract Cough    Molds & Smuts Cough    Pollen Extract Cough       Physical Exam    BP (!) 180/98 (BP Location: Left arm, Patient Position: Sitting, Cuff Size: Standard)   Pulse 82   Temp 98 2 °F (36 8 °C)   Ht 5' 4" (1 626 m)   Wt 129 kg (285 lb)   BMI 48 92 kg/m²     Constitutional: normal, well developed, well nourished, alert, in no distress and non-toxic and no overt pain behavior   and obese  Eyes: anicteric  HEENT: grossly intact  Neck: supple, symmetric, trachea midline and no masses   Pulmonary:even and unlabored  Cardiovascular:No edema or pitting edema present  Skin:Normal without rashes or lesions and well hydrated  Psychiatric:Mood and affect appropriate  Neurologic:Cranial Nerves II-XII grossly intact  Musculoskeletal:normal and ambulates with cane    Imaging  RIGHT KNEE @  12-21-21     INDICATION:   M25 561: Pain in right knee      COMPARISON:  Right knee x-rays 12/16/2020     VIEWS:  XR KNEE 3 VW RIGHT NON INJURY         FINDINGS:     There is no acute fracture or dislocation      There is no joint effusion      Tricompartmental osteoarthritis with severe narrowing of the medial tibiofemoral joint space and osteophytes seen in all 3 compartments  There is mild genu varus  Overall appearance is stable      No lytic or blastic osseous lesion      Soft tissues are unremarkable      IMPRESSION:     No acute osseous abnormality      Degenerative changes as described  I have personally reviewed pertinent films in PACS and my interpretation is Arthritis of the knee

## 2022-03-04 ENCOUNTER — TELEPHONE (OUTPATIENT)
Dept: RADIOLOGY | Facility: CLINIC | Age: 62
End: 2022-03-04

## 2022-03-04 NOTE — TELEPHONE ENCOUNTER
----- Message from Richard Renner DO sent at 3/2/2022  1:54 PM EST -----  You please see if her insurance covers geniculate RFA?

## 2022-03-09 ENCOUNTER — TELEPHONE (OUTPATIENT)
Dept: PAIN MEDICINE | Facility: CLINIC | Age: 62
End: 2022-03-09

## 2022-03-09 NOTE — TELEPHONE ENCOUNTER
----- Message from Janny Minaya DO sent at 3/8/2022 12:28 PM EST -----      Please let patient know her insurance will not cover the geniculate radiofrequency ingest will continue with Lyrica and follow-up as scheduled        CHRISTOPHER Echeverria, DO  This is not a covered benefit for this patient and ins            Previous Messages       ----- Message -----   From: Janny Minaya DO   Sent: 3/2/2022   1:54 PM EST   To: , #     You please see if her insurance covers geniculate RFA?

## 2022-03-10 ENCOUNTER — TELEPHONE (OUTPATIENT)
Dept: PAIN MEDICINE | Facility: CLINIC | Age: 62
End: 2022-03-10

## 2022-03-10 NOTE — TELEPHONE ENCOUNTER
LVM to find out who is the subscriber on her [employer]  We need that person's Information  Thanks

## 2022-04-01 DIAGNOSIS — E03.8 OTHER SPECIFIED HYPOTHYROIDISM: ICD-10-CM

## 2022-04-01 RX ORDER — LEVOTHYROXINE SODIUM 0.15 MG/1
150 TABLET ORAL
Qty: 30 TABLET | Refills: 1 | Status: SHIPPED | OUTPATIENT
Start: 2022-04-01 | End: 2022-05-29

## 2022-04-03 DIAGNOSIS — M79.2 NEUROPATHIC PAIN: ICD-10-CM

## 2022-04-03 DIAGNOSIS — M17.11 PRIMARY OSTEOARTHRITIS OF RIGHT KNEE: ICD-10-CM

## 2022-04-03 RX ORDER — DICLOFENAC SODIUM 75 MG/1
75 TABLET, DELAYED RELEASE ORAL 2 TIMES DAILY
Qty: 60 TABLET | Refills: 0 | Status: CANCELLED | OUTPATIENT
Start: 2022-04-03

## 2022-04-03 RX ORDER — GABAPENTIN 100 MG/1
CAPSULE ORAL
Qty: 90 CAPSULE | Refills: 0 | Status: CANCELLED | OUTPATIENT
Start: 2022-04-03

## 2022-04-05 ENCOUNTER — TELEPHONE (OUTPATIENT)
Dept: PAIN MEDICINE | Facility: CLINIC | Age: 62
End: 2022-04-05

## 2022-04-05 RX ORDER — GABAPENTIN 100 MG/1
CAPSULE ORAL
Qty: 270 CAPSULE | Refills: 0 | Status: SHIPPED | OUTPATIENT
Start: 2022-04-05 | End: 2022-04-06 | Stop reason: SDUPTHER

## 2022-04-05 NOTE — TELEPHONE ENCOUNTER
----- Message from [employer] DO Margi sent at 4/5/2022  9:30 AM EDT -----  Refilled gabapentin to two pills 3 times a day and three pills 3 times a day

## 2022-04-05 NOTE — TELEPHONE ENCOUNTER
S/w pt, confirmed gabapentin 100 mg tid w/ + relief, no se's  Pt stated that she does continue with pain while walking  Pt stated that she would be interested in increasing the gabapentin if appropriate  Advised pt, the writer will d/w Dr Taylor Guajardo  Anticipate a rx will be sent to the pharmacy for pu today  This office will cb with instructions  Advised pt that her diclofenac has 1 refill  Pt stated that she did s/w [facility] and was told, no refills  Advised pt, the writer will fu  Anticipate a rx will be available for pu later today as well  Confirmed purvi ov on 4/13  Pt verbalized understanding and appreciation  [facility], confirmed [facility]ac [facility]ill on file  Per pharmacist, he will prepare this rx for her now and notify the the pt randy it is ready

## 2022-04-05 NOTE — TELEPHONE ENCOUNTER
Attempted to contact pt, lmom to cb  Provided cb nujmber and office hours           NOTE:  Diclofenac 3/2 w/ 1 refill  FU 4/13

## 2022-04-05 NOTE — TELEPHONE ENCOUNTER
S/w pt, advised of change in gabapentin 100 mg to 2 pills po tid x 7 days then 3 pills po tid moving forwarding  Advised pt that this change in medication may cause drowsiness  Do not drive or operate machinery until you are familiar with how this medication may affect you  Please cb if question or se's present  Fu as scheduled  Pt verbalized understanding and appreciation

## 2022-04-06 DIAGNOSIS — M79.2 NEUROPATHIC PAIN: ICD-10-CM

## 2022-04-06 RX ORDER — GABAPENTIN 100 MG/1
CAPSULE ORAL
Qty: 90 CAPSULE | OUTPATIENT
Start: 2022-04-06

## 2022-04-13 ENCOUNTER — OFFICE VISIT (OUTPATIENT)
Dept: PAIN MEDICINE | Facility: CLINIC | Age: 62
End: 2022-04-13
Payer: COMMERCIAL

## 2022-04-13 VITALS
WEIGHT: 288 LBS | HEIGHT: 64 IN | TEMPERATURE: 98.7 F | BODY MASS INDEX: 49.17 KG/M2 | DIASTOLIC BLOOD PRESSURE: 92 MMHG | HEART RATE: 84 BPM | SYSTOLIC BLOOD PRESSURE: 178 MMHG

## 2022-04-13 DIAGNOSIS — M17.11 PRIMARY OSTEOARTHRITIS OF RIGHT KNEE: ICD-10-CM

## 2022-04-13 DIAGNOSIS — M79.2 NEUROPATHIC PAIN: ICD-10-CM

## 2022-04-13 DIAGNOSIS — G89.29 CHRONIC PAIN OF RIGHT KNEE: ICD-10-CM

## 2022-04-13 DIAGNOSIS — M25.561 CHRONIC PAIN OF RIGHT KNEE: ICD-10-CM

## 2022-04-13 DIAGNOSIS — E66.9 OBESITY, UNSPECIFIED CLASSIFICATION, UNSPECIFIED OBESITY TYPE, UNSPECIFIED WHETHER SERIOUS COMORBIDITY PRESENT: ICD-10-CM

## 2022-04-13 DIAGNOSIS — G89.4 CHRONIC PAIN SYNDROME: Primary | ICD-10-CM

## 2022-04-13 PROCEDURE — 1036F TOBACCO NON-USER: CPT | Performed by: NURSE PRACTITIONER

## 2022-04-13 PROCEDURE — 3008F BODY MASS INDEX DOCD: CPT | Performed by: NURSE PRACTITIONER

## 2022-04-13 PROCEDURE — 99214 OFFICE O/P EST MOD 30 MIN: CPT | Performed by: NURSE PRACTITIONER

## 2022-04-13 RX ORDER — DICLOFENAC SODIUM 75 MG/1
75 TABLET, DELAYED RELEASE ORAL 2 TIMES DAILY
Qty: 60 TABLET | Refills: 1 | Status: SHIPPED | OUTPATIENT
Start: 2022-04-13 | End: 2022-05-02 | Stop reason: SDUPTHER

## 2022-04-13 RX ORDER — GABAPENTIN 100 MG/1
CAPSULE ORAL
Qty: 270 CAPSULE | Refills: 0 | Status: CANCELLED | OUTPATIENT
Start: 2022-04-13

## 2022-04-13 RX ORDER — DULOXETIN HYDROCHLORIDE 30 MG/1
CAPSULE, DELAYED RELEASE ORAL
Qty: 60 CAPSULE | Refills: 1 | Status: SHIPPED | OUTPATIENT
Start: 2022-04-13 | End: 2022-05-25

## 2022-04-13 NOTE — PATIENT INSTRUCTIONS
Gabapentin 100: Decrease to 100 mg 3 x daily x 5 days, then 100 mg 1 x daily x 5 days, then STOP  Duloxetine (By mouth)   Duloxetine (doo-LOX-e-teen)  Treats depression, anxiety, diabetic peripheral neuropathy, fibromyalgia, and chronic muscle or bone pain  This medicine is an SSNRI  Brand Name(s): Cymbalta, Drizalma Sprinkle, Irenka   There may be other brand names for this medicine  When This Medicine Should Not Be Used: This medicine is not right for everyone  Do not use it if you had an allergic reaction to duloxetine  How to Use This Medicine:   Capsule, Delayed Release Capsule  · Take your medicine as directed  Your dose may need to be changed several times to find what works best for you  · Delayed-release capsule: Swallow the capsule whole  Do not crush, chew, break, or open it  Do not open the Cymbalta® delayed-release capsule and sprinkle the contents on food or in liquids  · If you have trouble swallowing the Page Cast delayed release capsule:   ? You may open the capsule and sprinkle the contents over one tablespoon (15 mL) of applesauce  Swallow the mixture right away and do not save any of the mixture to use later  ? You may open the capsule and pour the contents to an all plastic catheter tip syringe and add 50 mL of water  Do not use other liquids  Gently shake it for 10 seconds, and then use it through a nasogastric tube  Rinse with additional water (about 15 mL) if needed  · This medicine should come with a Medication Guide  Ask your pharmacist for a copy if you do not have one  · Missed dose: Take a dose as soon as you remember  If it is almost time for your next dose, wait until then and take a regular dose  Do not take extra medicine to make up for a missed dose  · Store the medicine in a closed container at room temperature, away from heat, moisture, and direct light    Drugs and Foods to Avoid:   Ask your doctor or pharmacist before using any other medicine, including over-the-counter medicines, vitamins, and herbal products  · Do not take duloxetine if you have used an MAO inhibitor (MAOI) within the past 14 days  Do not start taking an MAO inhibitor within 5 days of stopping duloxetine  Ask your doctor if you are not sure if you take an MAOI, including linezolid or methylene blue injection  · Some medicines can affect how duloxetine works  Tell your doctor if you are using any of the following:  ? Buspirone, cimetidine, ciprofloxacin, enoxacin, fentanyl, fluvoxamine, lithium, Keon's wort, theophylline, tramadol, tryptophan, warfarin  ? Amphetamines  ? Blood pressure medicine  ? Diuretic (water pill)  ? Medicine for heart rhythm problems (including flecainide, propafenone, quinidine)  ? Medicine to treat migraine headaches (including triptans)  ? NSAID pain or arthritis medicine (including aspirin, celecoxib, diclofenac, ibuprofen, naproxen)  ? Other medicine to treat depression or mood disorders (including amitriptyline, desipramine, fluoxetine, imipramine, nortriptyline, paroxetine)  ? Phenothiazine medicine (including thioridazine)  ? Stomach medicine (including famotidine, antacids containing aluminum or magnesium, PPIs)  · Tell your doctor if you use anything else that makes you sleepy  Some examples are allergy medicine, narcotic pain medicine, and alcohol  · Do not drink alcohol while you are using this medicine  Warnings While Using This Medicine:   · Tell your doctor if you are pregnant or breastfeeding, or if you have kidney disease, liver disease, bleeding problems, diabetes, digestion problems, glaucoma, heart disease, high or low blood pressure, or problems with urination  Tell your doctor if you smoke or you have a history of seizures, mental health problems (including bipolar disorder, corrine), or drug or alcohol addiction  · This medicine may cause the following problems:   ? Serious liver problems  ?  Serotonin syndrome, when used with certain medicines  ? Increased risk of bleeding problems  ? Serious skin reactions, including erythema multiforme, Corona-Duane syndrome  ? Low sodium levels in the blood  ? Sexual problems  · This medicine can increase thoughts of suicide  Tell your doctor right away if you start to feel depressed and have thoughts about hurting yourself  · This medicine may cause blurred vision, dizziness, drowsiness, trouble with thinking, or trouble with controlling body movements, which may lead to falls, fractures, or other injuries  Do not drive or do anything that could be dangerous until you know how this medicine affects you  Stand up slowly to avoid falls  · Do not stop using this medicine suddenly  Your doctor will need to slowly decrease your dose before you stop it completely  · Your doctor will check your progress and the effects of this medicine at regular visits  Keep all appointments  · Keep all medicine out of the reach of children  Never share your medicine with anyone    Possible Side Effects While Using This Medicine:   Call your doctor right away if you notice any of these side effects:  · Allergic reaction: Itching or hives, swelling in your face or hands, swelling or tingling in your mouth or throat, chest tightness, trouble breathing  · Anxiety, restlessness, fever, fast heartbeat, sweating, muscle spasms, diarrhea, seeing or hearing things that are not there  · Blistering, peeling, red skin rash  · Confusion, weakness, muscle twitching  · Dark urine or pale stools, nausea, vomiting, loss of appetite, stomach pain, yellow skin or eyes  · Decrease in how much or how often you urinate  · Decrease in sexual ability, desire, drive, or performance  · Eye pain, vision changes, seeing halos around lights  · Feeling more energetic than usual  · Lightheadedness, dizziness, fainting  · Unusual moods or behaviors, worsening depression, thoughts about hurting yourself, trouble sleeping  · Unusual bleeding or bruising  If you notice these less serious side effects, talk with your doctor:   · Decrease in appetite or weight  · Dry mouth, constipation, mild nausea  · Headache  · Unusual drowsiness, sleepiness, or tiredness  If you notice other side effects that you think are caused by this medicine, tell your doctor  Call your doctor for medical advice about side effects  You may report side effects to FDA at 6-583-FDA-5408    © Copyright [employer] 2022 Information is for End User's use only and may not be sold, redistributed or otherwise used for commercial purposes  The above information is an  only  It is not intended as medical advice for individual conditions or treatments  Talk to your doctor, nurse or pharmacist before following any medical regimen to see if it is safe and effective for you

## 2022-04-13 NOTE — PROGRESS NOTES
Assessment:  1  Chronic pain syndrome    2  Chronic pain of right knee    3  Neuropathic pain    4  Primary osteoarthritis of right knee    5  Obesity, unspecified classification, unspecified obesity type, unspecified whether serious comorbidity present        Plan:  While the patient was in the office today, I did have a thorough conversation with the patient regarding their chronic pain syndrome, symptoms, medication regimen, and treatment plan  I discussed the patient at this point time since there does not seem to be any procedure options that would be helpful because her insurance does not cover the geniculate nerve blocks or radiofrequency ablation procedures and injections have not been successful, we need to do our best to try to concentrate on working on her weight loss goals to she can proceed with her right knee replacement surgery  I did have a thorough conversation with the patient and explained to her that I feel it may be beneficial for her to consider proceeding with a consultation with our weight management program to see what options she would have an helping her achieve her weight loss goals  The patient was agreeable and verbalized an understanding  In the meantime, with regards to her medication regimen, I did have a thorough conversation with the patient and her  and explained the patient that weight gain with gabapentin can be common and even though it is helping we would have to continue to try to titrate up on the gabapentin to a more therapeutic dose over time  I did explained the patient that typically with gabapentin the weight gain is not on going but typically a sustained amount  I advised the patient that 1 option would be to just stay on the gabapentin and continue to slowly titrate up on it to see if it provides better relief and to see if we can keep her more active to better manage her pain and help her with her weight loss and surgery goals    I explained the patient the other option would be to titrate her off the gabapentin and try different neuropathic medications such as Cymbalta as Cymbalta typically is not associated with weight gain  At this point time the patient preferred to titrate off of the gabapentin and try the Cymbalta  I advised the patient to titrate down and off the gabapentin as discussed and late out in her after visit summary and to start the Cymbalta as prescribed as well  The patient denied being prescribed any anti-depressant and/or psychiatric medications  I reviewed with the patient that it may take 3-4 weeks for the medication's effects to be noticed and that it should never be abruptly stopped  Possible side effects include but are not limited to; vertigo, lethargy, nausea, worsening depression/anxiety, and confusion  I advised the patient to call our office if they experience any side effects  The patient verbalized an understanding  The patient will follow-up in 6 weeks for medication prescription refill and reevaluation  The patient was advised to contact the office should their symptoms worsen in the interim  The patient was agreeable and verbalized an understanding  History of Present Illness: The patient is a 57 y o  female last seen on 3/2/2022 who presents for a follow up office visit in regards to chronic pain syndrome, as the patient's pain has been ongoing for greater than a year, secondary to right knee osteoarthritis with neuropathic pain  The patient currently reports that since her last office visit she does feel there has been some improvement in her pain symptoms as she does feel that the gabapentin and diclofenac do seem to be somewhat helpful as she does seem to be sleeping a little bit better at nighttime    Patient reports that she just started the 900 mg a day dosing of the gabapentin today and each time she increases the gabapentin she does feel it makes her a little tired or woozy but then after a few days it goes back to normal   However, the patient has notice that since starting the gabapentin she has gained weight, which is very frustrating as she has not changed any for habits and continues to work on a diet and weight loss plan so she can qualify for her right knee replacement surgery  Since her last office visit we had also looked in to the possibility of proceeding with the geniculate nerve blocks to see if she could be a candidate for the radiofrequency of the geniculate nerve to try to help with the knee pain, however, unfortunately her insurance does not cover this procedure  The patient and her  present today to discuss her medication regimen and treatment plan options  Current pain medications includes:  Gabapentin 300 mg t i d and diclofenac 75 mg b i d  p r n  for pain  The patient reports that this regimen is providing 50% pain relief  The patient is reporting sleepiness from this pain medication regimen  I have personally reviewed and/or updated the patient's past medical history, past surgical history, family history, social history, current medications, allergies, and vital signs today  Review of Systems:    Review of Systems   Respiratory: Negative for shortness of breath  Cardiovascular: Negative for chest pain  Gastrointestinal: Negative for constipation, diarrhea, nausea and vomiting  Musculoskeletal: Positive for gait problem  Negative for arthralgias, joint swelling (joint stiffness) and myalgias  Skin: Negative for rash  Neurological: Negative for dizziness, seizures and weakness  All other systems reviewed and are negative  Past Medical History:   Diagnosis Date    Allergic     pollen    Anxiety     Arthritis approx   2015    right knee    Asthma     sleep apnea    Disease of thyroid gland     Hypertension     Obesity lifelong       Past Surgical History:   Procedure Laterality Date    ANKLE SURGERY      s/p bone graft to talus due to talar cyst       Family History   Problem Relation Age of Onset    Mental illness Mother         Borderline personality disorder    Diabetes Mother     Stroke Mother     Eating disorder Mother     Hypertension Mother     Psychiatric Illness Mother         BPD    Cancer Father         penile cancer    Hypertension Father     No Known Problems Brother     Eating disorder Maternal Grandmother     Glaucoma Maternal Grandmother        Social History     Occupational History    Not on file   Tobacco Use    Smoking status: Never Smoker    Smokeless tobacco: Never Used   Vaping Use    Vaping Use: Never used   Substance and Sexual Activity    Alcohol use:  Yes     Alcohol/week: 8 0 standard drinks     Types: 4 Glasses of wine, 4 Standard drinks or equivalent per week    Drug use: Not Currently     Types: Amphetamines, Barbiturates, Hashish, Marijuana     Comment: Late 70's, early [de-identified] - stopped on my own    Sexual activity: Not Currently     Partners: Male     Birth control/protection: Post-menopausal         Current Outpatient Medications:     Albuterol Sulfate 108 (90 Base) MCG/ACT AEPB, Inhale 1 puff, Disp: , Rfl:     ALPRAZolam (Xanax) 0 5 mg tablet, Take 1 tablet (0 5 mg total) by mouth daily as needed for anxiety, Disp: 30 tablet, Rfl: 0    Ascorbic Acid 500 MG CAPS, Take 2,000 mg by mouth daily  , Disp: , Rfl:     Cholecalciferol (Vitamin D3) 50 MCG (2000 UT) capsule, 4,000 Units  , Disp: , Rfl:     gabapentin (NEURONTIN) 100 mg capsule, Take two pills 3 times daily for seven days then three pills 3 times daily, Disp: 270 capsule, Rfl: 0    levothyroxine 150 mcg tablet, TAKE 1 TABLET (150 MCG TOTAL) BY MOUTH DAILY IN THE EARLY MORNING, Disp: 30 tablet, Rfl: 1    lisinopril (ZESTRIL) 10 mg tablet, Take 1 tablet (10 mg total) by mouth 2 (two) times a day, Disp: 180 tablet, Rfl: 1    diazepam (VALIUM) 10 mg tablet, Uses once only prior to dental procedures , Disp: , Rfl:     diclofenac (VOLTAREN) 75 mg EC tablet, Take 1 tablet (75 mg total) by mouth 2 (two) times a day, Disp: 60 tablet, Rfl: 1    DULoxetine (CYMBALTA) 30 mg delayed release capsule, Take 1 PO HS x 2 weeks, then 2 PO HS , Disp: 60 capsule, Rfl: 1    triazolam (HALCION) 0 25 MG tablet, 0 25 mg Uses just prior to dental procedures , Disp: , Rfl:     Allergies   Allergen Reactions    Dust Mite Extract Cough    Molds & Smuts Cough    Pollen Extract Cough       Physical Exam:    BP (!) 178/92 (BP Location: Left arm, Patient Position: Sitting, Cuff Size: Large)   Pulse 84   Temp 98 7 °F (37 1 °C)   Ht 5' 4" (1 626 m)   Wt 131 kg (288 lb)   BMI 49 44 kg/m²     Constitutional:normal, well developed, well nourished, alert, in no distress and non-toxic and no overt pain behavior  and obese  Eyes:anicteric  HEENT:grossly intact  Neck:supple, symmetric, trachea midline and no masses   Pulmonary:even and unlabored  Cardiovascular:No edema or pitting edema present  Skin:Normal without rashes or lesions and well hydrated  Psychiatric:Mood and affect appropriate  Neurologic:Cranial Nerves II-XII grossly intact  Musculoskeletal:The patient's gait is antalgic, painful at times, but steady with the use of a single cane        Imaging  No orders to display         Orders Placed This Encounter   Procedures    Ambulatory Referral to Weight Management

## 2022-05-02 DIAGNOSIS — M17.11 PRIMARY OSTEOARTHRITIS OF RIGHT KNEE: ICD-10-CM

## 2022-05-02 RX ORDER — DICLOFENAC SODIUM 75 MG/1
TABLET, DELAYED RELEASE ORAL
Qty: 60 TABLET | Refills: 1 | Status: SHIPPED | OUTPATIENT
Start: 2022-05-02 | End: 2022-05-31

## 2022-05-03 ENCOUNTER — TELEPHONE (OUTPATIENT)
Dept: OBGYN CLINIC | Facility: OTHER | Age: 62
End: 2022-05-03

## 2022-05-03 NOTE — TELEPHONE ENCOUNTER
Following up on voicemail    Patient had called to get her appointment with Dr Sandee Hunter rescheduled,    Called patient, she could not hear me for some reason

## 2022-05-25 ENCOUNTER — OFFICE VISIT (OUTPATIENT)
Dept: PAIN MEDICINE | Facility: CLINIC | Age: 62
End: 2022-05-25
Payer: COMMERCIAL

## 2022-05-25 VITALS
WEIGHT: 281 LBS | BODY MASS INDEX: 47.97 KG/M2 | HEIGHT: 64 IN | HEART RATE: 82 BPM | SYSTOLIC BLOOD PRESSURE: 158 MMHG | TEMPERATURE: 98.8 F | DIASTOLIC BLOOD PRESSURE: 88 MMHG

## 2022-05-25 DIAGNOSIS — G89.29 CHRONIC PAIN OF RIGHT KNEE: ICD-10-CM

## 2022-05-25 DIAGNOSIS — G89.4 CHRONIC PAIN SYNDROME: Primary | ICD-10-CM

## 2022-05-25 DIAGNOSIS — M25.561 CHRONIC PAIN OF RIGHT KNEE: ICD-10-CM

## 2022-05-25 DIAGNOSIS — M79.2 NEUROPATHIC PAIN: ICD-10-CM

## 2022-05-25 DIAGNOSIS — M17.11 PRIMARY OSTEOARTHRITIS OF RIGHT KNEE: ICD-10-CM

## 2022-05-25 PROCEDURE — 99214 OFFICE O/P EST MOD 30 MIN: CPT | Performed by: NURSE PRACTITIONER

## 2022-05-25 RX ORDER — NORTRIPTYLINE HYDROCHLORIDE 10 MG/1
CAPSULE ORAL
Qty: 90 CAPSULE | Refills: 0 | Status: SHIPPED | OUTPATIENT
Start: 2022-05-25 | End: 2022-06-20

## 2022-05-25 RX ORDER — DULOXETIN HYDROCHLORIDE 30 MG/1
CAPSULE, DELAYED RELEASE ORAL
Qty: 60 CAPSULE | Refills: 1 | Status: CANCELLED | OUTPATIENT
Start: 2022-05-25

## 2022-05-25 NOTE — PATIENT INSTRUCTIONS
Cymbalta 30 mg: Decrease to 1 pill daily and start Nortriptyline 10 mg at bed time x 3 days, then,   STOP Cymbalta and increase Nortriptyline to 2 pills at bed time x 5 days, then,   Increase Nortriptyline to 3 pills at bed time  Nortriptyline (By mouth)   Nortriptyline (nor-TRIP-ti-rob)  Treats depression  This medicine is a TCA  Brand Name(s): Pamelor   There may be other brand names for this medicine  When This Medicine Should Not Be Used: This medicine is not right for everyone  Do not use it if you had an allergic reaction to nortriptyline or similar medicines, or if you had a recent heart attack  How to Use This Medicine:   Capsule, Liquid  Take your medicine as directed  Your dose may need to be changed several times to find what works best for you  Oral liquid: Measure the oral liquid medicine with a marked measuring spoon, oral syringe, or medicine cup  This medicine should come with a Medication Guide  Ask your pharmacist for a copy if you do not have one  Missed dose: Take a dose as soon as you remember  If it is almost time for your next dose, wait until then and take a regular dose  Do not take extra medicine to make up for a missed dose  Store the medicine in a closed container at room temperature, away from heat, moisture, and direct light  Drugs and Foods to Avoid:   Ask your doctor or pharmacist before using any other medicine, including over-the-counter medicines, vitamins, and herbal products  Do not use this medicine and an MAO inhibitor (MAOI) within 14 days of each other    Tell your doctor if you are using the following:   Buspirone, chlorpropamide, cimetidine, fentanyl, guanethidine, lithium, reserpine, Keon's wort, tramadol, tryptophan  Medicine for heart rhythm problems (including flecainide, propafenone)  Phenothiazine medicine (including chlorpromazine, perphenazine, prochlorperazine, promethazine, thioridazine)  Thyroid medicine  Triptan medicine to treat migraine headaches  Alcohol, narcotic pain relievers, or sleeping pills may cause you to feel more lightheaded, dizzy, or faint when used with this medicine  Tell your doctor if you drink alcohol or use pain relievers or sleeping pills  Warnings While Using This Medicine:   Tell your doctor if you are pregnant or breastfeeding, or if you have heart disease (including Brugada syndrome), heart rhythm problems, glaucoma, depression, thyroid problems, trouble urinating, or a history of seizures or other mental problems  This medicine may cause the following problems:  Increased risk of heart attack, hearth rhythm problems, or stroke  Serotonin syndrome (especially when used with certain medicines)  Eye or vision problems  For some children, teenagers, and young adults, this medicine may increase mental or emotional problems  This may lead to thoughts of suicide and violence  Talk with your doctor right away if you have any thoughts or behavior changes that concern you  Tell your doctor if you or anyone in your family has a history of bipolar disorder or suicide attempts  This medicine may make you dizzy or drowsy  Do not drive or do anything else that could be dangerous until you know how this medicine affects you  Do not stop using this medicine suddenly  Your doctor will need to slowly decrease your dose before you stop it completely  Tell any doctor or dentist who treats you that you are using this medicine  You may need to stop using this medicine several days before you have surgery or medical tests  Your doctor will do lab tests at regular visits to check on the effects of this medicine  Keep all appointments  Keep all medicine out of the reach of children  Never share your medicine with anyone  Possible Side Effects While Using This Medicine:   Call your doctor right away if you notice any of these side effects:   Allergic reaction: Itching or hives, swelling in your face or hands, swelling or tingling in your mouth or throat, chest tightness, trouble breathing  Anxiety, restlessness, fever, sweating, muscle spasms, twitching, nausea, vomiting, diarrhea, seeing or hearing things that are not there  Change in how much or how often you urinate, problems urinating  Chest pain that may spread to your arms, jaw, back, or neck, trouble breathing, nausea, unusual sweating, dizziness, lightheadedness  Eye pain, vision changes, seeing halos around lights  Fast, pounding, or uneven heartbeat  Numbness or weakness in your arm or leg, or on one side of your body  Seizures or tremors  Sudden or severe headache, problems with speech or walking  Thoughts of hurting yourself or others, unusual behavior  Unexplained fainting  If you notice other side effects that you think are caused by this medicine, tell your doctor  Call your doctor for medical advice about side effects  You may report side effects to FDA at 2-892-FDA-2856    © Copyright [employer] 2022 Information is for End User's use only and may not be sold, redistributed or otherwise used for commercial purposes  The above information is an  only  It is not intended as medical advice for individual conditions or treatments  Talk to your doctor, nurse or pharmacist before following any medical regimen to see if it is safe and effective for you

## 2022-05-25 NOTE — PROGRESS NOTES
Assessment:  1  Chronic pain syndrome    2  Chronic pain of right knee    3  Neuropathic pain    4  Primary osteoarthritis of right knee        Plan:  While the patient was in the office today, I did have a thorough conversation with the patient regarding their chronic pain syndrome, symptoms, medication regimen, and treatment plan  I encouraged the patient continue with her water therapy and diet and weight loss plan  The patient was agreeable and verbalized an understanding  With regards to her medication regimen, at this point time as we discussed the possibility of giving the Cymbalta more time or even increasing the Cymbalta and seeing how she does or titrating off of the Cymbalta and trying a different neuropathic medications such as nortriptyline  At this point time the patient preferred to titrate off of the Cymbalta and try the nortriptyline  The patient is to titrate off of the Cymbalta and on to the nortriptyline as discussed  The patient denied being prescribed any anti-depressant and/or psychiatric medications  I reviewed with the patient that it may take 3-4 weeks for the medication's effects to be noticed and that it should never be abruptly stopped  Possible side effects include but are not limited to; vertigo, lethargy, nausea, worsening depression/anxiety, and confustion  I advised the patient to call our office if they experience any side effects  The patient verbalized an understanding  With regards to medical marijuana, I did explained the patient that I feel it may be reasonable for her to consider medical marijuana to help with her anxiety as well as her sleep and gave her the website information to see if she would be a candidate for medical marijuana products in the future  The patient was very appreciative of the information  The patient will follow-up in 8 weeks for medication prescription refill and reevaluation   The patient was advised to contact the office should their symptoms worsen in the interim  The patient was agreeable and verbalized an understanding  History of Present Illness: The patient is a 57 y o  female last seen on 4/13/2022 who presents for a follow up office visit in regards to chronic pain syndrome, as the patient's pain has been ongoing for greater than a year, secondary to primary osteoarthritis of the right knee with neuropathic pain  The patient currently reports that since her last office visit in her pain symptoms have slightly improved as she does feel that on the good days there is some overall improvement since switching from the gabapentin to the Cymbalta, however, her bad days are still bad  The patient also reports that since she has titrated off of the gabapentin she has lost another 8 lbs  The patient reports that she has switched taking the Cymbalta to 60 mg in the morning to see if it would help with some of the insomnia at nighttime, however, she is still having some issues getting more than 3 or 4 hours of sleep since starting the Cymbalta  The patient reports that she is not sure how much she likes the Cymbalta and feels it is more helpful than the gabapentin but is not sure that she wants to stay on the Cymbalta with the current side effects  The patient also reports that she has been seriously considering the possibility of medical marijuana products at least to help her sleep better until she can get through the weight loss process to proceed with her knee replacement surgery  The patient also reports she continues with her pool therapy and her consistent efforts for diet and weight loss  The patient presents today with her  for regular medication follow-up visit  Current pain medications includes:  Cymbalta 60 mg daily and diclofenac 75 mg b i d  p r n  for pain  The patient reports that this regimen is providing 10-15% pain relief    The patient is reporting insomnia and mental dullness from this pain medication regimen  I have personally reviewed and/or updated the patient's past medical history, past surgical history, family history, social history, current medications, allergies, and vital signs today  Review of Systems:    Review of Systems   Respiratory: Negative for shortness of breath  Cardiovascular: Negative for chest pain  Gastrointestinal: Negative for constipation, diarrhea, nausea and vomiting  Musculoskeletal: Positive for gait problem  Negative for arthralgias, joint swelling and myalgias  Skin: Negative for rash  Neurological: Negative for dizziness, seizures and weakness  All other systems reviewed and are negative  Past Medical History:   Diagnosis Date    Allergic     pollen    Anxiety     Arthritis approx  2015    right knee    Asthma     sleep apnea    Disease of thyroid gland     Hypertension     Obesity lifelong       Past Surgical History:   Procedure Laterality Date    ANKLE SURGERY      s/p bone graft to talus due to talar cyst       Family History   Problem Relation Age of Onset    Mental illness Mother         Borderline personality disorder    Diabetes Mother     Stroke Mother     Eating disorder Mother     Hypertension Mother     Psychiatric Illness Mother         BPD    Cancer Father         penile cancer    Hypertension Father     No Known Problems Brother     Eating disorder Maternal Grandmother     Glaucoma Maternal Grandmother        Social History     Occupational History    Not on file   Tobacco Use    Smoking status: Never Smoker    Smokeless tobacco: Never Used   Vaping Use    Vaping Use: Never used   Substance and Sexual Activity    Alcohol use:  Yes     Alcohol/week: 8 0 standard drinks     Types: 4 Glasses of wine, 4 Standard drinks or equivalent per week    Drug use: Not Currently     Types: Amphetamines, Barbiturates, Hashish, Marijuana     Comment: Late 66's, early [de-identified] - stopped on my own    Sexual activity: Not Currently Partners: Male     Birth control/protection: Post-menopausal         Current Outpatient Medications:     Albuterol Sulfate 108 (90 Base) MCG/ACT AEPB, Inhale 1 puff, Disp: , Rfl:     Ascorbic Acid 500 MG CAPS, Take 2,000 mg by mouth daily  , Disp: , Rfl:     Biotin w/ Vitamins C & E (HAIR SKIN & NAILS GUMMIES PO), Take by mouth, Disp: , Rfl:     Cholecalciferol (Vitamin D3) 50 MCG (2000 UT) capsule, 4,000 Units  , Disp: , Rfl:     diclofenac (VOLTAREN) 75 mg EC tablet, Take 1 PO BID PRN for pain , Disp: 60 tablet, Rfl: 1    levothyroxine 150 mcg tablet, TAKE 1 TABLET (150 MCG TOTAL) BY MOUTH DAILY IN THE EARLY MORNING, Disp: 30 tablet, Rfl: 1    lisinopril (ZESTRIL) 10 mg tablet, Take 1 tablet (10 mg total) by mouth 2 (two) times a day, Disp: 180 tablet, Rfl: 1    nortriptyline (PAMELOR) 10 mg capsule, Take 1 PO HS x 3 days, then 2 PO HS x 5 days, then 3 PO HS , Disp: 90 capsule, Rfl: 0    ALPRAZolam (Xanax) 0 5 mg tablet, Take 1 tablet (0 5 mg total) by mouth daily as needed for anxiety, Disp: 30 tablet, Rfl: 0    diazepam (VALIUM) 10 mg tablet, Uses once only prior to dental procedures , Disp: , Rfl:     triazolam (HALCION) 0 25 MG tablet, 0 25 mg Uses just prior to dental procedures , Disp: , Rfl:     Allergies   Allergen Reactions    Dust Mite Extract Cough    Molds & Smuts Cough    Pollen Extract Cough       Physical Exam:    /88 (BP Location: Left arm, Patient Position: Sitting, Cuff Size: Standard)   Pulse 82   Temp 98 8 °F (37 1 °C)   Ht 5' 4" (1 626 m)   Wt 127 kg (281 lb)   BMI 48 23 kg/m²     Constitutional:normal, well developed, well nourished, alert, in no distress and non-toxic and no overt pain behavior   and obese  Eyes:anicteric  HEENT:grossly intact  Neck:supple, symmetric, trachea midline and no masses   Pulmonary:even and unlabored  Cardiovascular:No edema or pitting edema present  Skin:Normal without rashes or lesions and well hydrated  Psychiatric:Mood and affect appropriate  Neurologic:Cranial Nerves II-XII grossly intact  Musculoskeletal:The patient's gait is slightly antalgic, but steady with the use of a single cane  Imaging  No orders to display         No orders of the defined types were placed in this encounter

## 2022-05-29 DIAGNOSIS — I10 BENIGN ESSENTIAL HTN: ICD-10-CM

## 2022-05-29 DIAGNOSIS — E03.8 OTHER SPECIFIED HYPOTHYROIDISM: ICD-10-CM

## 2022-05-29 RX ORDER — LISINOPRIL 10 MG/1
TABLET ORAL
Qty: 60 TABLET | Refills: 5 | Status: SHIPPED | OUTPATIENT
Start: 2022-05-29 | End: 2022-06-27

## 2022-05-29 RX ORDER — LEVOTHYROXINE SODIUM 0.15 MG/1
150 TABLET ORAL
Qty: 30 TABLET | Refills: 1 | Status: SHIPPED | OUTPATIENT
Start: 2022-05-29 | End: 2022-06-27 | Stop reason: SDUPTHER

## 2022-05-31 ENCOUNTER — OFFICE VISIT (OUTPATIENT)
Dept: OBGYN CLINIC | Facility: CLINIC | Age: 62
End: 2022-05-31
Payer: COMMERCIAL

## 2022-05-31 ENCOUNTER — OFFICE VISIT (OUTPATIENT)
Dept: FAMILY MEDICINE CLINIC | Facility: CLINIC | Age: 62
End: 2022-05-31
Payer: COMMERCIAL

## 2022-05-31 VITALS
BODY MASS INDEX: 47.97 KG/M2 | SYSTOLIC BLOOD PRESSURE: 144 MMHG | HEIGHT: 64 IN | WEIGHT: 281 LBS | DIASTOLIC BLOOD PRESSURE: 78 MMHG | HEART RATE: 96 BPM

## 2022-05-31 VITALS
OXYGEN SATURATION: 100 % | WEIGHT: 280 LBS | DIASTOLIC BLOOD PRESSURE: 90 MMHG | HEART RATE: 92 BPM | BODY MASS INDEX: 47.8 KG/M2 | RESPIRATION RATE: 18 BRPM | SYSTOLIC BLOOD PRESSURE: 160 MMHG | HEIGHT: 64 IN | TEMPERATURE: 98 F

## 2022-05-31 DIAGNOSIS — F41.1 GENERALIZED ANXIETY DISORDER: ICD-10-CM

## 2022-05-31 DIAGNOSIS — Z91.030 BEE STING ALLERGY: ICD-10-CM

## 2022-05-31 DIAGNOSIS — G89.29 CHRONIC PAIN OF RIGHT KNEE: ICD-10-CM

## 2022-05-31 DIAGNOSIS — E03.8 OTHER SPECIFIED HYPOTHYROIDISM: ICD-10-CM

## 2022-05-31 DIAGNOSIS — M25.561 CHRONIC PAIN OF RIGHT KNEE: ICD-10-CM

## 2022-05-31 DIAGNOSIS — M17.11 PRIMARY OSTEOARTHRITIS OF RIGHT KNEE: Primary | ICD-10-CM

## 2022-05-31 DIAGNOSIS — E66.01 CLASS 3 SEVERE OBESITY DUE TO EXCESS CALORIES WITH SERIOUS COMORBIDITY AND BODY MASS INDEX (BMI) OF 45.0 TO 49.9 IN ADULT (HCC): ICD-10-CM

## 2022-05-31 DIAGNOSIS — G47.33 OBSTRUCTIVE SLEEP APNEA: ICD-10-CM

## 2022-05-31 DIAGNOSIS — I10 BENIGN ESSENTIAL HTN: Primary | ICD-10-CM

## 2022-05-31 PROCEDURE — 99213 OFFICE O/P EST LOW 20 MIN: CPT | Performed by: ORTHOPAEDIC SURGERY

## 2022-05-31 PROCEDURE — 3008F BODY MASS INDEX DOCD: CPT | Performed by: ORTHOPAEDIC SURGERY

## 2022-05-31 PROCEDURE — 1036F TOBACCO NON-USER: CPT | Performed by: ORTHOPAEDIC SURGERY

## 2022-05-31 PROCEDURE — 99214 OFFICE O/P EST MOD 30 MIN: CPT | Performed by: NURSE PRACTITIONER

## 2022-05-31 RX ORDER — DILTIAZEM HYDROCHLORIDE 60 MG/1
TABLET, FILM COATED ORAL
COMMUNITY
Start: 2022-05-29 | End: 2022-05-31 | Stop reason: ALTCHOICE

## 2022-05-31 RX ORDER — EPINEPHRINE 0.3 MG/.3ML
0.3 INJECTION SUBCUTANEOUS ONCE
Qty: 0.6 ML | Refills: 0 | Status: SHIPPED | OUTPATIENT
Start: 2022-05-31 | End: 2022-05-31

## 2022-05-31 NOTE — PROGRESS NOTES
FAMILY PRACTICE OFFICE VISIT       NAME: Chio Raines  AGE: 57 y o  SEX: female       : 1960        MRN: [id number]    Assessment and Plan   1  Benign essential HTN  Assessment & Plan:  Blood pressure is elevated today  Usually well controlled with home checks  Will resume regular home BP monitoring and will call if BP persistently > 140/90       2  Chronic pain of right knee  Assessment & Plan:  Secondary to osteoarthritis  Planning for knee replacement, working on weight loss so she can proceed with this  Continues follow up with Dr Lee Luu  3  Generalized anxiety disorder  Assessment & Plan:  Continues with counselor  Currently using Alprazolam sparingly  Last prescription provided in 2021, for 30 tabs  Does not need a refill yet  Is considering medical marijuana  4  Class 3 severe obesity due to excess calories with serious comorbidity and body mass index (BMI) of 45 0 to 49 9 in [facility])  Assessment & Plan:  Has been successful in losing 20 pounds, through deep water exercise, and dietary changes  Encouraged to keep up the great work! 5  Obstructive sleep apnea  Assessment & Plan:  Stable on CPAP  Follows with Luciana Trujillo, Dr Esther Canales  6  Other specified hypothyroidism  Assessment & Plan:  Stable on current dose of levothyroxine  7  Bee sting allergy  Comments:  Never had allergic reaction to be sting  Recently started beekeeping at home and is very worried about having reaction to a sting  Requests Epi pen for at home  Orders:  -     EPINEPHrine (EPIPEN) 0 3 mg/0 3 mL SOAJ; Inject 0 3 mL (0 3 mg total) into a muscle once for 1 dose       Follow in 6 months  Complete blood work at this time  Chief Complaint     Chief Complaint   Patient presents with    Follow-up     Pt is here for 6 mos f/u BP       History of Present Illness     Isaias Raines is a 57year old female presenting today for follow up on chronic conditions  Participates in Aquatic exercises 3 days per week  Loves this  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, to become an instructor  Deep water exercise  Lost 20 pounds  1 5 hours 3 times per week  [employer]  Eating better  Completely cut out alcohol, sweets, snacking in the car  Eating whole foods, cooking at home, growing fruits and veggies  Smaller portions  Knee replacement planning with Dr Funmi Lock, once she loses more weight  Continues with Dr Timothy Trevino for pain management  Trial of gabapentin, caused weight gain, which was upsetting  Tried Cymbalta and diclofenac  Cymbalta- caused insomnia, and increased BP  Diclofenac, not effective, ibuprofen is better  Stopped Cymbalta  Nortriptyline started, feeling more like herself, sleeping better  Home blood pressure, has not checked recently, since med changes  Will start to monitor again  Was 120's/70's  Happy with weight loss, Feels better  Already notices she can be more active in her garden this year in comparison to last year  Medical marijuana, considering for anxiety  Currently using Alprazolam, but infrequently  Started bee keeping at home  Requests Epi Pen to keep on hand, never had a reaction, but is very fearful of this  She had first aid training in the past, and is aware how to use an Epi Pen  Has a lot of anxiety associated with medical care  Managing more appointments over the past year, and doing well  Is considering doing a mammogram      Will do blood work  Review of Systems   Review of Systems   Constitutional: Negative  HENT: Negative  Respiratory: Negative  Cardiovascular: Negative  Gastrointestinal: Negative  Genitourinary: Negative  Musculoskeletal: Positive for arthralgias (knee pain)  Skin: Negative  Neurological: Negative  Hematological: Negative  Psychiatric/Behavioral: Negative          I have reviewed the patient's medical history in detail; there are no changes to the history as noted in the electronic medical record  Objective     Vitals:    05/31/22 1123   BP: 160/90   Pulse: 92   Resp: 18   Temp: 98 °F (36 7 °C)   TempSrc: Temporal   SpO2: 100%   Weight: 127 kg (280 lb)   Height: 5' 4" (1 626 m)     Wt Readings from Last 3 Encounters:   05/31/22 127 kg (281 lb)   05/31/22 127 kg (280 lb)   05/25/22 127 kg (281 lb)     Physical Exam  Vitals and nursing note reviewed  Constitutional:       General: She is not in acute distress  Appearance: Normal appearance  She is well-developed  She is not ill-appearing  HENT:      Head: Normocephalic and atraumatic  Right Ear: Tympanic membrane and ear canal normal       Left Ear: Tympanic membrane and ear canal normal    Eyes:      Conjunctiva/sclera: Conjunctivae normal       Pupils: Pupils are equal, round, and reactive to light  Neck:      Thyroid: No thyromegaly  Cardiovascular:      Rate and Rhythm: Normal rate and regular rhythm  Pulses:           Dorsalis pedis pulses are 2+ on the right side and 2+ on the left side  Heart sounds: No murmur heard  Pulmonary:      Effort: Pulmonary effort is normal       Breath sounds: Normal breath sounds  Abdominal:      General: Bowel sounds are normal       Palpations: Abdomen is soft  Tenderness: There is no abdominal tenderness  Musculoskeletal:      Cervical back: Normal range of motion and neck supple  Right lower leg: No edema  Left lower leg: No edema  Lymphadenopathy:      Cervical: No cervical adenopathy  Skin:     Findings: No rash  Neurological:      Mental Status: She is alert  Psychiatric:         Mood and Affect: Mood normal          Depression Screening and Follow-up Plan: Patient was screened for depression during today's encounter  They screened negative with a PHQ-2 score of 0          ALLERGIES:  Allergies   Allergen Reactions    Dust Mite Extract Cough    Molds & Smuts Cough  Pollen Extract Cough       Current Medications     Current Outpatient Medications   Medication Sig Dispense Refill    Albuterol Sulfate 108 (90 Base) MCG/ACT AEPB Inhale 1 puff      ALPRAZolam (Xanax) 0 5 mg tablet Take 1 tablet (0 5 mg total) by mouth daily as needed for anxiety 30 tablet 0    Ascorbic Acid 500 MG CAPS Take 2,000 mg by mouth daily        Biotin w/ Vitamins C & E (HAIR SKIN & NAILS GUMMIES PO) Take by mouth      Cholecalciferol (Vitamin D3) 50 MCG (2000 UT) capsule 4,000 Units        diazepam (VALIUM) 10 mg tablet Uses once only prior to dental procedures       EPINEPHrine (EPIPEN) 0 3 mg/0 3 mL SOAJ Inject 0 3 mL (0 3 mg total) into a muscle once for 1 dose 0 6 mL 0    levothyroxine 150 mcg tablet TAKE 1 TABLET (150 MCG TOTAL) BY MOUTH DAILY IN THE EARLY MORNING 30 tablet 1    lisinopril (ZESTRIL) 10 mg tablet TAKE 1 TABLET BY MOUTH TWICE A DAY 60 tablet 5    nortriptyline (PAMELOR) 10 mg capsule Take 1 PO HS x 3 days, then 2 PO HS x 5 days, then 3 PO HS  90 capsule 0    triazolam (HALCION) 0 25 MG tablet 0 25 mg Uses just prior to dental procedures        No current facility-administered medications for this visit           Health Maintenance     Health Maintenance   Topic Date Due    Pneumococcal Vaccine: Pediatrics (0 to 5 Years) and At-Risk Patients (6 to 59 Years) (1 - PCV) Never done    HIV Screening  Never done    Breast Cancer Screening: Mammogram  Never done    Annual Physical  01/08/2022    COVID-19 Vaccine (4 - Booster for Pfizer series) 04/17/2022    Influenza Vaccine (Season Ended) 09/01/2022    BMI: Followup Plan  11/30/2022    Depression Screening  05/31/2023    BMI: Adult  05/31/2023    Cervical Cancer Screening  11/21/2023    Colorectal Cancer Screening  02/08/2024    DTaP,Tdap,and Td Vaccines (2 - Td or Tdap) 01/08/2031    Hepatitis C Screening  Completed    HIB Vaccine  Aged Out    Hepatitis B Vaccine  Aged Out    IPV Vaccine  Aged Out    Hepatitis A Vaccine  Aged Out    Meningococcal ACWY Vaccine  Aged Out    HPV Vaccine  Aged Out     Immunization History   Administered Date(s) Administered    COVID-19 PFIZER VACCINE 0 3 ML IM 03/29/2021, 04/23/2021, 12/17/2021    Tdap 01/08/2021       EVANGELINA Persaud

## 2022-05-31 NOTE — PROGRESS NOTES
Orthopedics          Yudy Bush Hubert Raines 57 y o  female MRN: [id number]      Chief Complaint:   right knee pain    HPI:   57 y  o female complaining of right knee pain  Patient presents office today regarding right knee pain  Patient is currently working on weight loss on her own at this time doing water exercise  Patient notes and weight loss over the past few weeks time  Patient did receive an injection in her right knee due to right knee pain due to osteoarthritis  Patient states only having a few days pain relief following her injection she denies any instability right knee  She does ambulate with the assistance of walker significant limited due due to her right knee pain  She denies any instability clicking popping catching of her right knee  Denies any changes numbness tingling  Review Of Systems:   · Skin: Normal  · Neuro: See HPI  · Musculoskeletal: See HPI  · All other systems reviewed and are negative    Past Medical History:   Past Medical History:   Diagnosis Date    Allergic     pollen    Arthritis approx   2015    right knee    Disease of thyroid gland     Obesity lifelong       Past Surgical History:   Past Surgical History:   Procedure Laterality Date    ANKLE SURGERY      s/p bone graft to talus due to talar cyst       Family History:  Family history reviewed and non-contributory  Family History   Problem Relation Age of Onset    Mental illness Mother         Borderline personality disorder    Diabetes Mother     Stroke Mother     Eating disorder Mother     Hypertension Mother     Psychiatric Illness Mother         BPD    Cancer Father         penile cancer    Hypertension Father     No Known Problems Brother     Eating disorder Maternal Grandmother     Glaucoma Maternal Grandmother          Social History:  Social History     Socioeconomic History    Marital status: /Civil Union     Spouse name: None    Number of children: None    Years of education: None  Highest education level: None   Occupational History    None   Tobacco Use    Smoking status: Never Smoker    Smokeless tobacco: Never Used   Vaping Use    Vaping Use: Never used   Substance and Sexual Activity    Alcohol use: Yes     Alcohol/week: 8 0 standard drinks     Types: 4 Glasses of wine, 4 Standard drinks or equivalent per week     Comment: rare    Drug use: Not Currently     Types: Amphetamines, Barbiturates, Hashish, Marijuana     Comment: Late 66's, early [de-identified] - stopped on my own    Sexual activity: Not Currently     Partners: Male     Birth control/protection: Post-menopausal   Other Topics Concern    None   Social History Narrative    None     Social Determinants of Health     Financial Resource Strain: Not on file   Food Insecurity: Not on file   Transportation Needs: Not on file   Physical Activity: Not on file   Stress: Not on file   Social Connections: Not on file   Intimate Partner Violence: Not on file   Housing Stability: Not on file       Allergies:    Allergies   Allergen Reactions    Dust Mite Extract Cough    Molds & Smuts Cough    Pollen Extract Cough       Labs:  0   Lab Value Date/Time    HCT 34 8 04/07/2021 1125    HCT 36 2 12/31/2020 1129    HGB 11 7 04/07/2021 1125    HGB 12 1 12/31/2020 1129    WBC 11 7 (H) 04/07/2021 1125    WBC 13 0 (H) 12/31/2020 1129       Meds:    Current Outpatient Medications:     Albuterol Sulfate 108 (90 Base) MCG/ACT AEPB, Inhale 1 puff, Disp: , Rfl:     ALPRAZolam (Xanax) 0 5 mg tablet, Take 1 tablet (0 5 mg total) by mouth daily as needed for anxiety, Disp: 30 tablet, Rfl: 0    Ascorbic Acid 500 MG CAPS, Take 2,000 mg by mouth daily  , Disp: , Rfl:     Biotin w/ Vitamins C & E (HAIR SKIN & NAILS GUMMIES PO), Take by mouth, Disp: , Rfl:     Cholecalciferol (Vitamin D3) 50 MCG (2000 UT) capsule, 4,000 Units  , Disp: , Rfl:     diazepam (VALIUM) 10 mg tablet, Uses once only prior to dental procedures , Disp: , Rfl:     EPINEPHrine (EPIPEN) 0 3 mg/0 3 mL SOAJ, Inject 0 3 mL (0 3 mg total) into a muscle once for 1 dose, Disp: 0 6 mL, Rfl: 0    levothyroxine 150 mcg tablet, TAKE 1 TABLET (150 MCG TOTAL) BY MOUTH DAILY IN THE EARLY MORNING, Disp: 30 tablet, Rfl: 1    lisinopril (ZESTRIL) 10 mg tablet, TAKE 1 TABLET BY MOUTH TWICE A DAY, Disp: 60 tablet, Rfl: 5    nortriptyline (PAMELOR) 10 mg capsule, Take 1 PO HS x 3 days, then 2 PO HS x 5 days, then 3 PO HS , Disp: 90 capsule, Rfl: 0    triazolam (HALCION) 0 25 MG tablet, 0 25 mg Uses just prior to dental procedures , Disp: , Rfl:       Physical Exam:   Vitals:    05/31/22 1538   BP: 144/78   Pulse: 96       General Appearance:    Alert, cooperative, no distress, appears stated age   Head:    Normocephalic, without obvious abnormality, atraumatic   Eyes:    conjunctiva/corneas clear, both eyes        Nose:   Nares normal, septum midline, no drainage    Throat:   Lips normal; teeth and gums normal   Neck:    symmetrical, trachea midline, ;     thyroid:  no enlargement/   Back:     Symmetric, no curvature, ROM normal   Lungs:   No audible wheezing or labored breathing   Chest Wall:    No tenderness or deformity    Heart:    Regular rate and rhythm               Pulses:   2+ and symmetric all extremities   Skin:   Skin color, texture, turgor normal, no rashes or lesions   Neurologic:   normal strength, sensation and reflexes     throughout       Musculoskeletal: right lower extremity  · On examination of the right knee there is no effusion, no erythema  Range of motion to full active extension and flexion to greater than 120°  Pain on palpation medial and lateral joint lines  There is crepitus with range of motion, no warmth to palpation, bony enlargement noted  No pain on palpation pes anserine bursa region or distal iliotibial band  Stable to varus and valgus stress without pain or gapping  Negative anterior and posterior drawer testing    Sensation intact distal pulses present         _*_*_*_*_*_*_*_*_*_*_*_*_*_*_*_*_*_*_*_*_*_*_*_*_*_*_*_*_*_*_*_*_*_*_*_*_*_*_*_*_*    Assessment:  57 y  o female with right knee pain due to osteoarthritis    Plan:   · Weight bearing as tolerated  right lower extremity  · Case discussed with Dr Minna Tavarez  · Advised patient BMI goal is below 45 she will need to lose approximately 15-20 lb to obtain this   · Follow-up in 3 months time repeat evaluation possibly scheduling right total knee arthroplasty  · Will avoid any injections as she denies significant pain relief following these at this time   · Patient continue pain medications as prescribed by pain management        Robbie Briones PA-C

## 2022-06-06 PROBLEM — E66.01 CLASS 3 SEVERE OBESITY DUE TO EXCESS CALORIES WITH SERIOUS COMORBIDITY AND BODY MASS INDEX (BMI) OF 45.0 TO 49.9 IN ADULT (HCC): Status: ACTIVE | Noted: 2022-06-06

## 2022-06-06 PROBLEM — E66.813 CLASS 3 SEVERE OBESITY DUE TO EXCESS CALORIES WITH SERIOUS COMORBIDITY AND BODY MASS INDEX (BMI) OF 45.0 TO 49.9 IN ADULT (HCC): Status: ACTIVE | Noted: 2022-06-06

## 2022-06-06 NOTE — ASSESSMENT & PLAN NOTE
Continues with counselor  Currently using Alprazolam sparingly  Last prescription provided in November 2021, for 30 tabs  Does not need a refill yet  Is considering medical marijuana

## 2022-06-06 NOTE — ASSESSMENT & PLAN NOTE
Secondary to osteoarthritis  Planning for knee replacement, working on weight loss so she can proceed with this  Continues follow up with Dr Katherine St

## 2022-06-06 NOTE — ASSESSMENT & PLAN NOTE
Has been successful in losing 20 pounds, through deep water exercise, and dietary changes  Encouraged to keep up the great work!

## 2022-06-06 NOTE — ASSESSMENT & PLAN NOTE
Blood pressure is elevated today  Usually well controlled with home checks  Will resume regular home BP monitoring and will call if BP persistently > 140/90

## 2022-06-13 ENCOUNTER — PATIENT MESSAGE (OUTPATIENT)
Dept: OBGYN CLINIC | Facility: CLINIC | Age: 62
End: 2022-06-13

## 2022-06-26 DIAGNOSIS — I10 BENIGN ESSENTIAL HTN: ICD-10-CM

## 2022-06-27 DIAGNOSIS — E03.8 OTHER SPECIFIED HYPOTHYROIDISM: ICD-10-CM

## 2022-06-27 RX ORDER — LEVOTHYROXINE SODIUM 0.15 MG/1
150 TABLET ORAL
Qty: 30 TABLET | Refills: 0 | Status: SHIPPED | OUTPATIENT
Start: 2022-06-27 | End: 2022-07-24

## 2022-06-27 RX ORDER — LISINOPRIL 10 MG/1
TABLET ORAL
Qty: 180 TABLET | Refills: 2 | Status: SHIPPED | OUTPATIENT
Start: 2022-06-27

## 2022-07-12 DIAGNOSIS — G89.4 CHRONIC PAIN SYNDROME: ICD-10-CM

## 2022-07-12 DIAGNOSIS — M17.11 PRIMARY OSTEOARTHRITIS OF RIGHT KNEE: ICD-10-CM

## 2022-07-12 DIAGNOSIS — M25.561 CHRONIC PAIN OF RIGHT KNEE: ICD-10-CM

## 2022-07-12 DIAGNOSIS — M79.2 NEUROPATHIC PAIN: ICD-10-CM

## 2022-07-12 DIAGNOSIS — G89.29 CHRONIC PAIN OF RIGHT KNEE: ICD-10-CM

## 2022-07-12 RX ORDER — NORTRIPTYLINE HYDROCHLORIDE 10 MG/1
CAPSULE ORAL
Qty: 60 CAPSULE | Refills: 0 | Status: CANCELLED | OUTPATIENT
Start: 2022-07-12

## 2022-07-12 NOTE — TELEPHONE ENCOUNTER
Please cancel the pt's refill request  S/w pt, stated that she has good relief w/ sleep and water exercise with nortriptyline 10 mg 2 pills at hs  Pt stated that she has tried increasing to 30 mg however she is too tired into the following day  Advised pt, OK to continue w/ the 20 mg or she could try taking the nortriptyline a few hours earlier / 6 -7 pm-rohith and see if that helps with the drowsiness the next day  Pt verbalized understanding and agreement, stated that she will try this and cb on friday to discuss

## 2022-07-21 DIAGNOSIS — M25.561 CHRONIC PAIN OF RIGHT KNEE: ICD-10-CM

## 2022-07-21 DIAGNOSIS — M79.2 NEUROPATHIC PAIN: ICD-10-CM

## 2022-07-21 DIAGNOSIS — G89.4 CHRONIC PAIN SYNDROME: ICD-10-CM

## 2022-07-21 DIAGNOSIS — G89.29 CHRONIC PAIN OF RIGHT KNEE: ICD-10-CM

## 2022-07-21 DIAGNOSIS — M17.11 PRIMARY OSTEOARTHRITIS OF RIGHT KNEE: ICD-10-CM

## 2022-07-21 RX ORDER — NORTRIPTYLINE HYDROCHLORIDE 10 MG/1
CAPSULE ORAL
Qty: 90 CAPSULE | Refills: 0 | Status: SHIPPED | OUTPATIENT
Start: 2022-07-21 | End: 2022-07-28 | Stop reason: SDUPTHER

## 2022-07-24 DIAGNOSIS — E03.8 OTHER SPECIFIED HYPOTHYROIDISM: ICD-10-CM

## 2022-07-24 RX ORDER — LEVOTHYROXINE SODIUM 0.15 MG/1
150 TABLET ORAL
Qty: 30 TABLET | Refills: 0 | Status: SHIPPED | OUTPATIENT
Start: 2022-07-24

## 2022-07-28 ENCOUNTER — OFFICE VISIT (OUTPATIENT)
Dept: PAIN MEDICINE | Facility: CLINIC | Age: 62
End: 2022-07-28
Payer: COMMERCIAL

## 2022-07-28 VITALS
HEIGHT: 64 IN | DIASTOLIC BLOOD PRESSURE: 84 MMHG | HEART RATE: 92 BPM | WEIGHT: 277 LBS | TEMPERATURE: 98.7 F | SYSTOLIC BLOOD PRESSURE: 138 MMHG | BODY MASS INDEX: 47.29 KG/M2

## 2022-07-28 DIAGNOSIS — M25.561 CHRONIC PAIN OF RIGHT KNEE: ICD-10-CM

## 2022-07-28 DIAGNOSIS — M79.2 NEUROPATHIC PAIN: ICD-10-CM

## 2022-07-28 DIAGNOSIS — G89.4 CHRONIC PAIN SYNDROME: Primary | ICD-10-CM

## 2022-07-28 DIAGNOSIS — G89.29 CHRONIC PAIN OF RIGHT KNEE: ICD-10-CM

## 2022-07-28 DIAGNOSIS — M17.11 PRIMARY OSTEOARTHRITIS OF RIGHT KNEE: ICD-10-CM

## 2022-07-28 PROCEDURE — 99214 OFFICE O/P EST MOD 30 MIN: CPT | Performed by: NURSE PRACTITIONER

## 2022-07-28 RX ORDER — NORTRIPTYLINE HYDROCHLORIDE 50 MG/1
CAPSULE ORAL
Qty: 30 CAPSULE | Refills: 2 | Status: SHIPPED | OUTPATIENT
Start: 2022-07-28 | End: 2022-08-12

## 2022-07-28 NOTE — PROGRESS NOTES
Assessment:  1  Chronic pain syndrome    2  Chronic pain of right knee    3  Primary osteoarthritis of right knee    4  Neuropathic pain        Plan:  While the patient was in the office today, I did have a thorough conversation with the patient regarding their chronic pain syndrome, symptoms, medication regimen, and treatment plan  I discussed with and encouraged the patient to consider a 2nd surgical opinion and also gave her the name of Dr Milagros Fam for evaluation for her in possible knee replacement surgery, but I also continue to encourage her to continue with her diet and weight loss goals  The patient was agreeable and verbalized an understanding  I discussed with the patient at this point time since she is still on a very low and subtherapeutic dose of the nortriptyline with some improvement, without any significant or intolerable side effects at this point, I feel would be beneficial to increase the nortriptyline to 40 mg a day until she is out of the 10 mg pills and then start 50 mg a day and I sent a prescription for 50 mg capsule to take 1 pill daily with 2 refills to get her to her next office visit  I advised the patient that if they experience any side effects or issues with the changes in their medication regiment, they should give our office a call to discuss  I also advised the patient not to drive or operate machinery until they see how the changes in the medication regimen affects them  The patient was agreeable and verbalized an understanding  I encouraged the patient continue with her medical marijuana products and to follow-up with the physician who is qualifying her as a medical marijuana patient  The patient was agreeable and verbalized an understanding  The patient will follow-up in 12 weeks for medication prescription refill and reevaluation  The patient was advised to contact the office should their symptoms worsen in the interim   The patient was agreeable and verbalized an understanding  History of Present Illness: The patient is a 57 y o  female last seen on 5/25/2022 who presents for a follow up office visit in regards to chronic pain syndrome, as the patient's pain has been ongoing for greater than a year, secondary to primary osteoarthritis the right knee with neuropathic pain  The patient currently reports that since her last office visit overall her pain is been slightly improving with her current medication regimen, but she has also been trying to be more active and is exercising more as she continues to work on her weight loss goals so she can have her knee replacement surgery  Since her last office visit the patient did discontinue the Cymbalta and is currently on nortriptyline 30 mg daily and definitely feels that the nortriptyline is helping her sleep better at nighttime and have less pain when she is exercising but is still not managing her pain as much as she would like or lower to be as active as she would like should she can continue to work on her weight loss goals  She also reports that since her last office visit she has officially been approved for her medical marijuana card and just started a medical marijuana tinc sure which she does feel is somewhat helpful  The patient also reports that she is also seeking a 2nd surgical opinion to see if there is another surgeon who will consider proceeding with her right knee replacement surgery with her current weight loss because it is getting more more difficult for her to just get through her activities of daily living and to continue with her weight loss goals because of the pain and function  The patient and her  present today for regular medication follow-up visit and to discuss her treatment plan options  Current pain medications includes:  Nortriptyline 30 mg daily  The patient reports that this regimen is providing minimal to moderate pain relief    The patient is reporting no side effects from this pain medication regimen  I have personally reviewed and/or updated the patient's past medical history, past surgical history, family history, social history, current medications, allergies, and vital signs today  Review of Systems:    Review of Systems   Respiratory: Negative for shortness of breath  Cardiovascular: Negative for chest pain  Gastrointestinal: Negative for constipation, diarrhea, nausea and vomiting  Musculoskeletal: Positive for gait problem  Negative for arthralgias, joint swelling and myalgias  Skin: Negative for rash  Neurological: Negative for dizziness, seizures and weakness  All other systems reviewed and are negative  Past Medical History:   Diagnosis Date    Allergic     pollen    Arthritis approx  2015    right knee    Disease of thyroid gland     Obesity lifelong       Past Surgical History:   Procedure Laterality Date    ANKLE SURGERY      s/p bone graft to talus due to talar cyst       Family History   Problem Relation Age of Onset    Mental illness Mother         Borderline personality disorder    Diabetes Mother     Stroke Mother     Eating disorder Mother     Hypertension Mother     Psychiatric Illness Mother         BPD    Cancer Father         penile cancer    Hypertension Father     No Known Problems Brother     Eating disorder Maternal Grandmother     Glaucoma Maternal Grandmother        Social History     Occupational History    Not on file   Tobacco Use    Smoking status: Never Smoker    Smokeless tobacco: Never Used   Vaping Use    Vaping Use: Never used   Substance and Sexual Activity    Alcohol use:  Yes     Alcohol/week: 8 0 standard drinks     Types: 4 Glasses of wine, 4 Standard drinks or equivalent per week     Comment: rare    Drug use: Not Currently     Types: Amphetamines, Barbiturates, Hashish, Marijuana     Comment: Late 66's, early [de-identified] - stopped on my own    Sexual activity: Not Currently Partners: Male     Birth control/protection: Post-menopausal         Current Outpatient Medications:     Albuterol Sulfate 108 (90 Base) MCG/ACT AEPB, Inhale 1 puff, Disp: , Rfl:     ALPRAZolam (Xanax) 0 5 mg tablet, Take 1 tablet (0 5 mg total) by mouth daily as needed for anxiety, Disp: 30 tablet, Rfl: 0    Ascorbic Acid 500 MG CAPS, Take 2,000 mg by mouth daily  , Disp: , Rfl:     Biotin w/ Vitamins C & E (HAIR SKIN & NAILS GUMMIES PO), Take by mouth, Disp: , Rfl:     Cholecalciferol (Vitamin D3) 50 MCG (2000 UT) capsule, 4,000 Units  , Disp: , Rfl:     diazepam (VALIUM) 10 mg tablet, Uses once only prior to dental procedures , Disp: , Rfl:     levothyroxine 150 mcg tablet, Take 1 tablet (150 mcg total) by mouth daily in the early morning, Disp: 30 tablet, Rfl: 2    levothyroxine 150 mcg tablet, TAKE 1 TABLET (150 MCG TOTAL) BY MOUTH DAILY IN THE EARLY MORNING, Disp: 30 tablet, Rfl: 0    lisinopril (ZESTRIL) 10 mg tablet, TAKE 1 TABLET BY MOUTH TWICE A DAY, Disp: 180 tablet, Rfl: 2    nortriptyline (PAMELOR) 50 mg capsule, TAKE 1 PO HS , Disp: 30 capsule, Rfl: 2    triazolam (HALCION) 0 25 MG tablet, 0 25 mg Uses just prior to dental procedures , Disp: , Rfl:     EPINEPHrine (EPIPEN) 0 3 mg/0 3 mL SOAJ, Inject 0 3 mL (0 3 mg total) into a muscle once for 1 dose, Disp: 0 6 mL, Rfl: 0    Allergies   Allergen Reactions    Dust Mite Extract Cough    Molds & Smuts Cough    Pollen Extract Cough       Physical Exam:    /84 (BP Location: Left arm, Patient Position: Sitting, Cuff Size: Standard)   Pulse 92   Temp 98 7 °F (37 1 °C)   Ht 5' 4" (1 626 m)   Wt 126 kg (277 lb)   BMI 47 55 kg/m²     Constitutional:normal, well developed, well nourished, alert, in no distress and non-toxic and no overt pain behavior   and obese  Eyes:anicteric  HEENT:grossly intact  Neck:supple, symmetric, trachea midline and no masses   Pulmonary:even and unlabored  Cardiovascular:No edema or pitting edema present  Skin:Normal without rashes or lesions and well hydrated  Psychiatric:Mood and affect appropriate  Neurologic:Cranial Nerves II-XII grossly intact  Musculoskeletal:The patient's gait is slightly antalgic, painful at times, but steady with the use of a single cane  Imaging  No orders to display         No orders of the defined types were placed in this encounter

## 2022-08-10 LAB
25(OH)D3+25(OH)D2 SERPL-MCNC: 36.2 NG/ML (ref 30–100)
ALBUMIN SERPL-MCNC: 4.3 G/DL (ref 3.8–4.8)
ALBUMIN/GLOB SERPL: 1.9 {RATIO} (ref 1.2–2.2)
ALP SERPL-CCNC: 71 IU/L (ref 44–121)
ALT SERPL-CCNC: 15 IU/L (ref 0–32)
AST SERPL-CCNC: 11 IU/L (ref 0–40)
BASOPHILS # BLD AUTO: 0.1 X10E3/UL (ref 0–0.2)
BASOPHILS NFR BLD AUTO: 1 %
BILIRUB SERPL-MCNC: 0.5 MG/DL (ref 0–1.2)
BUN SERPL-MCNC: 9 MG/DL (ref 8–27)
BUN/CREAT SERPL: 13 (ref 12–28)
CALCIUM SERPL-MCNC: 9.2 MG/DL (ref 8.7–10.3)
CHLORIDE SERPL-SCNC: 97 MMOL/L (ref 96–106)
CHOLEST SERPL-MCNC: 179 MG/DL (ref 100–199)
CHOLEST/HDLC SERPL: 3 RATIO (ref 0–4.4)
CO2 SERPL-SCNC: 24 MMOL/L (ref 20–29)
CREAT SERPL-MCNC: 0.7 MG/DL (ref 0.57–1)
EGFR: 98 ML/MIN/1.73
EOSINOPHIL # BLD AUTO: 0.3 X10E3/UL (ref 0–0.4)
EOSINOPHIL NFR BLD AUTO: 3 %
ERYTHROCYTE [DISTWIDTH] IN BLOOD BY AUTOMATED COUNT: 14 % (ref 11.7–15.4)
GLOBULIN SER-MCNC: 2.3 G/DL (ref 1.5–4.5)
GLUCOSE SERPL-MCNC: 98 MG/DL (ref 65–99)
HCT VFR BLD AUTO: 34.4 % (ref 34–46.6)
HDLC SERPL-MCNC: 60 MG/DL
HGB BLD-MCNC: 11.5 G/DL (ref 11.1–15.9)
IMM GRANULOCYTES # BLD: 0 X10E3/UL (ref 0–0.1)
IMM GRANULOCYTES NFR BLD: 0 %
LDLC SERPL CALC-MCNC: 103 MG/DL (ref 0–99)
LYMPHOCYTES # BLD AUTO: 1.8 X10E3/UL (ref 0.7–3.1)
LYMPHOCYTES NFR BLD AUTO: 17 %
MCH RBC QN AUTO: 28.7 PG (ref 26.6–33)
MCHC RBC AUTO-ENTMCNC: 33.4 G/DL (ref 31.5–35.7)
MCV RBC AUTO: 86 FL (ref 79–97)
MONOCYTES # BLD AUTO: 0.7 X10E3/UL (ref 0.1–0.9)
MONOCYTES NFR BLD AUTO: 6 %
NEUTROPHILS # BLD AUTO: 7.6 X10E3/UL (ref 1.4–7)
NEUTROPHILS NFR BLD AUTO: 73 %
PLATELET # BLD AUTO: 357 X10E3/UL (ref 150–450)
POTASSIUM SERPL-SCNC: 4.6 MMOL/L (ref 3.5–5.2)
PROT SERPL-MCNC: 6.6 G/DL (ref 6–8.5)
RBC # BLD AUTO: 4.01 X10E6/UL (ref 3.77–5.28)
SL AMB VLDL CHOLESTEROL CALC: 16 MG/DL (ref 5–40)
SODIUM SERPL-SCNC: 135 MMOL/L (ref 134–144)
T4 FREE SERPL-MCNC: 1.29 NG/DL (ref 0.82–1.77)
TRIGL SERPL-MCNC: 89 MG/DL (ref 0–149)
TSH SERPL DL<=0.005 MIU/L-ACNC: 2.6 UIU/ML (ref 0.45–4.5)
WBC # BLD AUTO: 10.5 X10E3/UL (ref 3.4–10.8)

## 2022-08-11 DIAGNOSIS — E66.01 CLASS 3 SEVERE OBESITY DUE TO EXCESS CALORIES WITH SERIOUS COMORBIDITY AND BODY MASS INDEX (BMI) OF 45.0 TO 49.9 IN ADULT (HCC): ICD-10-CM

## 2022-08-11 DIAGNOSIS — M79.2 NEUROPATHIC PAIN: ICD-10-CM

## 2022-08-11 DIAGNOSIS — E03.8 OTHER SPECIFIED HYPOTHYROIDISM: Primary | ICD-10-CM

## 2022-08-11 DIAGNOSIS — G89.29 CHRONIC PAIN OF RIGHT KNEE: ICD-10-CM

## 2022-08-11 DIAGNOSIS — G89.4 CHRONIC PAIN SYNDROME: ICD-10-CM

## 2022-08-11 DIAGNOSIS — M25.561 CHRONIC PAIN OF RIGHT KNEE: ICD-10-CM

## 2022-08-11 DIAGNOSIS — M17.11 PRIMARY OSTEOARTHRITIS OF RIGHT KNEE: ICD-10-CM

## 2022-08-11 DIAGNOSIS — I10 BENIGN ESSENTIAL HTN: ICD-10-CM

## 2022-08-12 RX ORDER — NORTRIPTYLINE HYDROCHLORIDE 50 MG/1
CAPSULE ORAL
Qty: 90 CAPSULE | Refills: 1 | Status: SHIPPED | OUTPATIENT
Start: 2022-08-12 | End: 2022-10-26 | Stop reason: SDUPTHER

## 2022-10-11 DIAGNOSIS — E03.8 OTHER SPECIFIED HYPOTHYROIDISM: ICD-10-CM

## 2022-10-11 RX ORDER — LEVOTHYROXINE SODIUM 0.15 MG/1
150 TABLET ORAL
Qty: 90 TABLET | Refills: 1 | Status: SHIPPED | OUTPATIENT
Start: 2022-10-11

## 2022-10-26 ENCOUNTER — OFFICE VISIT (OUTPATIENT)
Dept: PAIN MEDICINE | Facility: CLINIC | Age: 62
End: 2022-10-26
Payer: COMMERCIAL

## 2022-10-26 VITALS
DIASTOLIC BLOOD PRESSURE: 82 MMHG | TEMPERATURE: 98.6 F | HEART RATE: 83 BPM | BODY MASS INDEX: 46.78 KG/M2 | SYSTOLIC BLOOD PRESSURE: 136 MMHG | HEIGHT: 64 IN | WEIGHT: 274 LBS

## 2022-10-26 DIAGNOSIS — M25.561 CHRONIC PAIN OF RIGHT KNEE: ICD-10-CM

## 2022-10-26 DIAGNOSIS — G89.29 CHRONIC PAIN OF RIGHT KNEE: ICD-10-CM

## 2022-10-26 DIAGNOSIS — M17.11 PRIMARY OSTEOARTHRITIS OF RIGHT KNEE: ICD-10-CM

## 2022-10-26 DIAGNOSIS — G89.4 CHRONIC PAIN SYNDROME: Primary | ICD-10-CM

## 2022-10-26 DIAGNOSIS — M79.2 NEUROPATHIC PAIN: ICD-10-CM

## 2022-10-26 PROCEDURE — 99214 OFFICE O/P EST MOD 30 MIN: CPT | Performed by: NURSE PRACTITIONER

## 2022-10-26 RX ORDER — NORTRIPTYLINE HYDROCHLORIDE 10 MG/1
CAPSULE ORAL
Qty: 120 CAPSULE | Refills: 0 | Status: SHIPPED | OUTPATIENT
Start: 2022-10-26

## 2023-01-13 DIAGNOSIS — G89.29 CHRONIC PAIN OF RIGHT KNEE: ICD-10-CM

## 2023-01-13 DIAGNOSIS — M79.2 NEUROPATHIC PAIN: ICD-10-CM

## 2023-01-13 DIAGNOSIS — M25.561 CHRONIC PAIN OF RIGHT KNEE: ICD-10-CM

## 2023-01-13 DIAGNOSIS — M17.11 PRIMARY OSTEOARTHRITIS OF RIGHT KNEE: ICD-10-CM

## 2023-01-13 DIAGNOSIS — G89.4 CHRONIC PAIN SYNDROME: ICD-10-CM

## 2023-01-13 RX ORDER — NORTRIPTYLINE HYDROCHLORIDE 10 MG/1
CAPSULE ORAL
Qty: 120 CAPSULE | Refills: 2 | Status: SHIPPED | OUTPATIENT
Start: 2023-01-13

## 2023-04-25 ENCOUNTER — OFFICE VISIT (OUTPATIENT)
Dept: FAMILY MEDICINE CLINIC | Facility: CLINIC | Age: 63
End: 2023-04-25

## 2023-04-25 DIAGNOSIS — E66.01 CLASS 3 SEVERE OBESITY DUE TO EXCESS CALORIES WITH SERIOUS COMORBIDITY AND BODY MASS INDEX (BMI) OF 45.0 TO 49.9 IN ADULT (HCC): ICD-10-CM

## 2023-04-25 DIAGNOSIS — Z00.00 PHYSICAL EXAM, ANNUAL: Primary | ICD-10-CM

## 2023-04-25 DIAGNOSIS — I10 BENIGN ESSENTIAL HTN: ICD-10-CM

## 2023-04-25 DIAGNOSIS — G47.33 OBSTRUCTIVE SLEEP APNEA: ICD-10-CM

## 2023-04-25 DIAGNOSIS — E03.8 OTHER SPECIFIED HYPOTHYROIDISM: ICD-10-CM

## 2023-04-25 DIAGNOSIS — F41.1 GENERALIZED ANXIETY DISORDER: ICD-10-CM

## 2023-04-25 DIAGNOSIS — M17.11 PRIMARY OSTEOARTHRITIS OF RIGHT KNEE: ICD-10-CM

## 2023-04-25 LAB
ALBUMIN SERPL-MCNC: 4.2 G/DL (ref 3.8–4.8)
ALBUMIN/GLOB SERPL: 1.8 {RATIO} (ref 1.2–2.2)
ALP SERPL-CCNC: 66 IU/L (ref 44–121)
ALT SERPL-CCNC: 18 IU/L (ref 0–32)
AST SERPL-CCNC: 15 IU/L (ref 0–40)
BILIRUB SERPL-MCNC: 0.3 MG/DL (ref 0–1.2)
BUN SERPL-MCNC: 12 MG/DL (ref 8–27)
BUN/CREAT SERPL: 19 (ref 12–28)
CALCIUM SERPL-MCNC: 9.4 MG/DL (ref 8.7–10.3)
CHLORIDE SERPL-SCNC: 100 MMOL/L (ref 96–106)
CHOLEST SERPL-MCNC: 206 MG/DL (ref 100–199)
CHOLEST/HDLC SERPL: 3.6 RATIO (ref 0–4.4)
CO2 SERPL-SCNC: 25 MMOL/L (ref 20–29)
CREAT SERPL-MCNC: 0.63 MG/DL (ref 0.57–1)
EGFR: 100 ML/MIN/1.73
ERYTHROCYTE [DISTWIDTH] IN BLOOD BY AUTOMATED COUNT: 14.1 % (ref 11.7–15.4)
GLOBULIN SER-MCNC: 2.4 G/DL (ref 1.5–4.5)
GLUCOSE SERPL-MCNC: 99 MG/DL (ref 70–99)
HCT VFR BLD AUTO: 32.8 % (ref 34–46.6)
HDLC SERPL-MCNC: 58 MG/DL
HGB BLD-MCNC: 11.3 G/DL (ref 11.1–15.9)
LDLC SERPL CALC-MCNC: 130 MG/DL (ref 0–99)
MCH RBC QN AUTO: 28.6 PG (ref 26.6–33)
MCHC RBC AUTO-ENTMCNC: 34.5 G/DL (ref 31.5–35.7)
MCV RBC AUTO: 83 FL (ref 79–97)
PLATELET # BLD AUTO: 401 X10E3/UL (ref 150–450)
POTASSIUM SERPL-SCNC: 4.7 MMOL/L (ref 3.5–5.2)
PROT SERPL-MCNC: 6.6 G/DL (ref 6–8.5)
RBC # BLD AUTO: 3.95 X10E6/UL (ref 3.77–5.28)
SL AMB VLDL CHOLESTEROL CALC: 18 MG/DL (ref 5–40)
SODIUM SERPL-SCNC: 138 MMOL/L (ref 134–144)
TRIGL SERPL-MCNC: 100 MG/DL (ref 0–149)
TSH SERPL DL<=0.005 MIU/L-ACNC: 3.8 UIU/ML (ref 0.45–4.5)
WBC # BLD AUTO: 9.9 X10E3/UL (ref 3.4–10.8)

## 2023-04-25 RX ORDER — LEVOTHYROXINE SODIUM 175 UG/1
175 TABLET ORAL
Qty: 90 TABLET | Refills: 3 | Status: SHIPPED | OUTPATIENT
Start: 2023-04-25

## 2023-04-25 NOTE — PROGRESS NOTES
FAMILY PRACTICE OFFICE VISIT       NAME: Chio Raines  AGE: 60 y o  SEX: female       : 1960        MRN: [id number]    Assessment and Plan   1  Physical exam, annual  Mammogram due  Pap will be due in fall  Shingrix vaccine recommended, contact insurance company regarding coverage  COVID-19 vaccines up to date  Tdap up to date  Cologuard up to date  2  Class 3 severe obesity due to excess calories with serious comorbidity and body mass index (BMI) of 45 0 to 49 9 in adult [facility])  Assessment & Plan:  BMI 46 86  She is slowly losing weight  Goal BMI for now <45, so she would be a candidate for right knee replacement with Dr Day Melara  3  Primary osteoarthritis of right knee  Assessment & Plan:  Knee replacement planned, once BMI is <45  Orders:  -     Ambulatory Referral to Orthopedic Surgery; Future    4  Other specified hypothyroidism  Assessment & Plan:  TSH 3 800  Will try to lower TSH more 0 5-1 5  which may help with making weight loss a little bit easier  Orders:  -     levothyroxine (Euthyrox) 175 mcg tablet; Take 1 tablet (175 mcg total) by mouth daily in the early morning  -     TSH, 3rd generation; Future  -     TSH, 3rd generation    5  Benign essential HTN  Assessment & Plan:  Blood pressure is elevated in office, with known white coat hypertension  Home blood pressures running 120-130's/70's  Continue lisinopril  6  Obstructive sleep apnea  Assessment & Plan:  Stable on CPAP  Follows with Dr Michel Ritchie  7  Generalized anxiety disorder  Assessment & Plan:  Xanax as needed, rarely used  Last prescription provided in   Follow up in 6 months  Chief Complaint     Chief Complaint   Patient presents with    Well Check     Pt is here for overall check up       History of Present Illness     Josue Adam Hubert Raines is a 60year old female presenting today for annual exam      Continues to work on weight loss  Weight is slowly coming down     Usually doing "home cooking, not processed  At most once per week, eating out  Exercises--swimming at the [facility]  Next week is going to try a new class at the --walking with ease  BMI is down to 46 86, form 50  Goal BMI is <45 in order to be able to obtain knee replacement  Knee pain is getting worse  On Nortriptyline 10 mg at bedtime to help with sleep and pain  Had COVID-19 about 2 weeks ago  Had 2 weeks of being sedentary and not able to go to the  for exercise  Trying to change attitude and self  Grew up in a family with disordered eating  Grandmother had anorexia  Mom had anorexia and bulimia  Lety Burr was put on diets, diet pills at young age  Her weight was always spoken about  Has good counselor, working through these things  Has a strong fear of mammogram, is discussing this with her counselor  Pap is due in the fall  Home blood pressures 120-130/70's  Lisnopril 10 mg bid    Shingles vaccine  Contact insurance regarding coverage  Review of Systems   Review of Systems   Constitutional: Negative  HENT: Negative  Respiratory: Negative  Cardiovascular: Negative  Gastrointestinal: Negative  Genitourinary: Negative  Musculoskeletal: Positive for arthralgias  Skin: Negative  Neurological: Negative  Hematological: Negative  Psychiatric/Behavioral: Negative  I have reviewed the patient's medical history in detail; there are no changes to the history as noted in the electronic medical record  Objective     Vitals:    04/25/23 1202   BP: (!) 150/110   Pulse: 92   Resp: 16   Temp: 98 °F (36 7 °C)   TempSrc: Temporal   SpO2: 100%   Weight: 124 kg (273 lb)   Height: 5' 4\" (1 626 m)     Wt Readings from Last 3 Encounters:   04/25/23 124 kg (273 lb)   10/26/22 124 kg (274 lb)   07/28/22 126 kg (277 lb)     Physical Exam  Vitals and nursing note reviewed  Constitutional:       General: She is not in acute distress       Appearance: " Normal appearance  She is well-developed  She is not ill-appearing  HENT:      Head: Normocephalic and atraumatic  Right Ear: Tympanic membrane normal       Left Ear: Tympanic membrane normal       Mouth/Throat:      Mouth: Mucous membranes are moist       Pharynx: Oropharynx is clear  Eyes:      Conjunctiva/sclera: Conjunctivae normal       Pupils: Pupils are equal, round, and reactive to light  Neck:      Thyroid: No thyromegaly  Cardiovascular:      Rate and Rhythm: Normal rate and regular rhythm  Heart sounds: No murmur heard  Pulmonary:      Effort: Pulmonary effort is normal       Breath sounds: Normal breath sounds  Musculoskeletal:      Cervical back: Normal range of motion and neck supple  Right lower leg: No edema  Left lower leg: No edema  Lymphadenopathy:      Cervical: No cervical adenopathy  Skin:     Findings: No rash  Neurological:      Mental Status: She is alert and oriented to person, place, and time  Psychiatric:         Mood and Affect: Mood normal        BMI Counseling: Body mass index is 44 63 kg/m²  The BMI is above normal  Nutrition recommendations include decreasing portion sizes, encouraging healthy choices of fruits and vegetables, moderation in carbohydrate intake and increasing intake of lean protein  Exercise recommendations include exercising 3-5 times per week  Rationale for BMI follow-up plan is due to patient being overweight or obese  Depression Screening and Follow-up Plan: Patient was screened for depression during today's encounter  They screened negative with a PHQ-2 score of 0          ALLERGIES:  Allergies   Allergen Reactions    Dust Mite Extract Cough    Molds & Smuts Cough    Pollen Extract Cough       Current Medications     Current Outpatient Medications   Medication Sig Dispense Refill    Albuterol Sulfate 108 (90 Base) MCG/ACT AEPB Inhale 1 puff      ALPRAZolam (Xanax) 0 5 mg tablet Take 1 tablet (0 5 mg total) by mouth daily as needed for anxiety 30 tablet 0    Ascorbic Acid 500 MG CAPS Take 2,000 mg by mouth daily        Biotin w/ Vitamins C & E (HAIR SKIN & NAILS GUMMIES PO) Take by mouth      Cholecalciferol (Vitamin D3) 50 MCG (2000 UT) capsule 4,000 Units        diazepam (VALIUM) 10 mg tablet Uses once only prior to dental procedures       levothyroxine (Euthyrox) 175 mcg tablet Take 1 tablet (175 mcg total) by mouth daily in the early morning 90 tablet 3    triazolam (HALCION) 0 25 MG tablet 0 25 mg Uses just prior to dental procedures       EPINEPHrine (EPIPEN) 0 3 mg/0 3 mL SOAJ Inject 0 3 mL (0 3 mg total) into a muscle once for 1 dose 0 6 mL 0    lisinopril (ZESTRIL) 10 mg tablet TAKE 1 TABLET BY MOUTH TWICE A DAY 60 tablet 8    nortriptyline (PAMELOR) 10 mg capsule TAKE 4 PO HS  120 capsule 0     No current facility-administered medications for this visit           Health Maintenance     Health Maintenance   Topic Date Due    Pneumococcal Vaccine: Pediatrics (0 to 5 Years) and At-Risk Patients (6 to 59 Years) (1 - PCV) Never done    HIV Screening  Never done    Breast Cancer Screening: Mammogram  Never done    COVID-19 Vaccine (5 - Booster for Pfizer series) 01/25/2023    Cervical Cancer Screening  11/21/2023    Colorectal Cancer Screening  02/08/2024    Depression Screening  04/25/2024    BMI: Adult  04/25/2024    Annual Physical  04/25/2024    BMI: Followup Plan  05/01/2024    DTaP,Tdap,and Td Vaccines (2 - Td or Tdap) 01/08/2031    Hepatitis C Screening  Completed    Influenza Vaccine  Completed    HIB Vaccine  Aged Out    IPV Vaccine  Aged Out    Hepatitis A Vaccine  Aged Out    Meningococcal ACWY Vaccine  Aged Out    HPV Vaccine  Aged Out     Immunization History   Administered Date(s) Administered    COVID-19 MODERNA VACC 0 5 ML IM 11/30/2022    COVID-19 PFIZER VACCINE 0 3 ML IM 03/29/2021, 04/23/2021, 12/17/2021    INFLUENZA 11/30/2022    Tdap 01/08/2021       Luz Laws, CRNP

## 2023-04-26 ENCOUNTER — TELEPHONE (OUTPATIENT)
Dept: PAIN MEDICINE | Facility: CLINIC | Age: 63
End: 2023-04-26

## 2023-04-26 DIAGNOSIS — G89.29 CHRONIC PAIN OF RIGHT KNEE: ICD-10-CM

## 2023-04-26 DIAGNOSIS — M25.561 CHRONIC PAIN OF RIGHT KNEE: ICD-10-CM

## 2023-04-26 DIAGNOSIS — G89.4 CHRONIC PAIN SYNDROME: ICD-10-CM

## 2023-04-26 DIAGNOSIS — M79.2 NEUROPATHIC PAIN: ICD-10-CM

## 2023-04-26 DIAGNOSIS — M17.11 PRIMARY OSTEOARTHRITIS OF RIGHT KNEE: ICD-10-CM

## 2023-04-26 RX ORDER — NORTRIPTYLINE HYDROCHLORIDE 10 MG/1
CAPSULE ORAL
Qty: 120 CAPSULE | Refills: 0 | Status: SHIPPED | OUTPATIENT
Start: 2023-04-26 | End: 2023-05-02 | Stop reason: SDUPTHER

## 2023-04-26 NOTE — TELEPHONE ENCOUNTER
Pt contacted Call Center requested refill of their medication  Medication Name: nortriptyline (PAMELOR) 10 mg capsule        Frequency of Med: TAKE 4 PO HS  Remaining Medication: 0      Ph[facility] and Location[facility] [address]    [facility] [address] , [place] [address]   Phone:  [phone]  Fax:  [phone]   ANUSHA #:  [id number]        Pt  Preferred Callback Phone Number:   [phone]    Thank you

## 2023-04-26 NOTE — TELEPHONE ENCOUNTER
S/w pt, confirmed nortriptyline 10 mg 4 pills at hs w/ + relief, no se's  Pt stated that she cancelled her previous appt s/t covid and confirmed her upcoming appt to fu on 5/2  Advised pt, the writer will d/w DG  Anticipate this rx will be sent to the pharmacy for pu later today  This office will cb if there is any question or change in the plan as discussed  Fu on 5/2 as scheduled  Pt verbalized understanding and appreciation

## 2023-04-28 DIAGNOSIS — Z12.31 SCREENING MAMMOGRAM FOR BREAST CANCER: Primary | ICD-10-CM

## 2023-04-29 DIAGNOSIS — I10 BENIGN ESSENTIAL HTN: ICD-10-CM

## 2023-04-29 RX ORDER — LISINOPRIL 10 MG/1
TABLET ORAL
Qty: 60 TABLET | Refills: 8 | Status: SHIPPED | OUTPATIENT
Start: 2023-04-29

## 2023-05-01 VITALS
HEIGHT: 64 IN | DIASTOLIC BLOOD PRESSURE: 110 MMHG | WEIGHT: 273 LBS | RESPIRATION RATE: 16 BRPM | HEART RATE: 92 BPM | SYSTOLIC BLOOD PRESSURE: 150 MMHG | BODY MASS INDEX: 46.61 KG/M2 | OXYGEN SATURATION: 100 % | TEMPERATURE: 98 F

## 2023-05-01 NOTE — ASSESSMENT & PLAN NOTE
Blood pressure is elevated in office, with known white coat hypertension  Home blood pressures running 120-130's/70's  Continue lisinopril

## 2023-05-01 NOTE — ASSESSMENT & PLAN NOTE
BMI 46 86  She is slowly losing weight  Goal BMI for now <45, so she would be a candidate for right knee replacement with Dr Jan Hoover

## 2023-05-01 NOTE — ASSESSMENT & PLAN NOTE
TSH 3 800  Will try to lower TSH more 0 5-1 5  which may help with making weight loss a little bit easier

## 2023-05-02 ENCOUNTER — OFFICE VISIT (OUTPATIENT)
Dept: PAIN MEDICINE | Facility: CLINIC | Age: 63
End: 2023-05-02

## 2023-05-02 VITALS
SYSTOLIC BLOOD PRESSURE: 134 MMHG | WEIGHT: 273 LBS | HEIGHT: 64 IN | HEART RATE: 99 BPM | TEMPERATURE: 97 F | BODY MASS INDEX: 46.61 KG/M2 | DIASTOLIC BLOOD PRESSURE: 82 MMHG

## 2023-05-02 DIAGNOSIS — M79.2 NEUROPATHIC PAIN: ICD-10-CM

## 2023-05-02 DIAGNOSIS — G89.29 CHRONIC PAIN OF RIGHT KNEE: ICD-10-CM

## 2023-05-02 DIAGNOSIS — M17.11 PRIMARY OSTEOARTHRITIS OF RIGHT KNEE: ICD-10-CM

## 2023-05-02 DIAGNOSIS — G89.4 CHRONIC PAIN SYNDROME: ICD-10-CM

## 2023-05-02 DIAGNOSIS — M25.561 CHRONIC PAIN OF RIGHT KNEE: ICD-10-CM

## 2023-05-02 RX ORDER — NORTRIPTYLINE HYDROCHLORIDE 10 MG/1
CAPSULE ORAL
Qty: 120 CAPSULE | Refills: 4 | Status: SHIPPED | OUTPATIENT
Start: 2023-05-02

## 2023-05-02 NOTE — PROGRESS NOTES
Assessment:  1  Primary osteoarthritis of right knee    2  Chronic pain of right knee    3  Neuropathic pain    4  Chronic pain syndrome        Plan:  While the patient was in the office today, I did have a thorough conversation regarding their chronic pain syndrome, medication management, and treatment plan options  The patient remains clinically stable with the nortriptyline 40 mg in the evening  She reports approximately 60% reduction in her symptoms  She has been working very hard on weight loss efforts and has about 10 more pounds to lose before she could consider the total knee arthroplasty that she will ultimately require  Patient states that she will schedule a reevaluation with orthopedics once she loses the weight  In the meantime we will continue the current medication regimen without change  Follow-up in 6 months or sooner if needed if the pain changes or worsens  The patient was advised to contact the office should their symptoms worsen in the interim  The patient was agreeable and verbalized an understanding  History of Present Illness: The patient is a 60 y o  female last seen on 10/26/2022 who presents for a follow up office visit in regards to secondary to knee pain  The patient currently reports chronic right knee pain that she presently rates a 7 out of 10 on the pain scale describes it as an intermittent dull, aching and sharp pain  The pain is primarily located in the medial compartment  She has been maintained by our office on nortriptyline 40 mg in the evening which has significantly helped with the pain that she was experiencing in the evening  She has been working very hard at [employer] and has lost a significant amount of weight  She has about 10 more pounds to lose before she would be considered for a total knee replacement      I have personally reviewed and/or updated the patient's past medical history, past surgical history, family history, social history, current medications, allergies, and vital signs today  Review of Systems:    Review of Systems   Respiratory: Negative for shortness of breath  Cardiovascular: Negative for chest pain  Gastrointestinal: Negative for constipation, diarrhea, nausea and vomiting  Musculoskeletal: Positive for gait problem  Negative for arthralgias, joint swelling and myalgias  Skin: Negative for rash  Neurological: Negative for dizziness, seizures and weakness  All other systems reviewed and are negative  Past Medical History:   Diagnosis Date    Allergic     pollen    Arthritis approx  2015    right knee    Disease of thyroid gland     Obesity lifelong       Past Surgical History:   Procedure Laterality Date    ANKLE SURGERY      s/p bone graft to talus due to talar cyst       Family History   Problem Relation Age of Onset    Mental illness Mother         Borderline personality disorder    Diabetes Mother     Stroke Mother     Eating disorder Mother     Hypertension Mother     Psychiatric Illness Mother         BPD    Cancer Father         penile cancer    Hypertension Father     No Known Problems Brother     Eating disorder Maternal Grandmother     Glaucoma Maternal Grandmother        Social History     Occupational History    Not on file   Tobacco Use    Smoking status: Never    Smokeless tobacco: Never   Vaping Use    Vaping Use: Never used   Substance and Sexual Activity    Alcohol use:  Yes     Alcohol/week: 8 0 standard drinks     Types: 4 Glasses of wine, 4 Standard drinks or equivalent per week     Comment: rare    Drug use: Not Currently     Types: Amphetamines, Barbiturates, Hashish, Marijuana     Comment: Late 70's, early [de-identified] - stopped on my own    Sexual activity: Not Currently     Partners: Male     Birth control/protection: Post-menopausal         Current Outpatient Medications:     Albuterol Sulfate 108 (90 Base) MCG/ACT AEPB, Inhale 1 puff, Disp: , Rfl:     ALPRAZolam (Xanax) "0 5 mg tablet, Take 1 tablet (0 5 mg total) by mouth daily as needed for anxiety, Disp: 30 tablet, Rfl: 0    Ascorbic Acid 500 MG CAPS, Take 2,000 mg by mouth daily  , Disp: , Rfl:     Biotin w/ Vitamins C & E (HAIR SKIN & NAILS GUMMIES PO), Take by mouth, Disp: , Rfl:     Cholecalciferol (Vitamin D3) 50 MCG (2000 UT) capsule, 4,000 Units  , Disp: , Rfl:     levothyroxine (Euthyrox) 175 mcg tablet, Take 1 tablet (175 mcg total) by mouth daily in the early morning, Disp: 90 tablet, Rfl: 3    lisinopril (ZESTRIL) 10 mg tablet, TAKE 1 TABLET BY MOUTH TWICE A DAY, Disp: 60 tablet, Rfl: 8    nortriptyline (PAMELOR) 10 mg capsule, TAKE 4 PO HS , Disp: 120 capsule, Rfl: 4    diazepam (VALIUM) 10 mg tablet, Uses once only prior to dental procedures  (Patient not taking: Reported on 5/2/2023), Disp: , Rfl:     EPINEPHrine (EPIPEN) 0 3 mg/0 3 mL SOAJ, Inject 0 3 mL (0 3 mg total) into a muscle once for 1 dose, Disp: 0 6 mL, Rfl: 0    triazolam (HALCION) 0 25 MG tablet, 0 25 mg Uses just prior to dental procedures  (Patient not taking: Reported on 5/2/2023), Disp: , Rfl:     Allergies   Allergen Reactions    Dust Mite Extract Cough    Molds & Smuts Cough    Pollen Extract Cough       Physical Exam:    /82 (BP Location: Left arm, Patient Position: Sitting, Cuff Size: Large)   Pulse 99   Temp (!) 97 °F (36 1 °C)   Ht 5' 4\" (1 626 m)   Wt 124 kg (273 lb)   BMI 46 86 kg/m²     Constitutional:normal, well developed, well nourished, alert, in no distress and non-toxic and no overt pain behavior    Eyes:anicteric  HEENT:grossly intact  Neck:supple, symmetric, trachea midline and no masses   Pulmonary:even and unlabored  Cardiovascular:No edema or pitting edema present  Skin:Normal without rashes or lesions and well hydrated  Psychiatric:Mood and affect appropriate  Neurologic:Cranial Nerves II-XII grossly intact  Musculoskeletal:ambulates with cane      Imaging  No orders to display         No orders of the " defined types were placed in this encounter

## 2023-05-15 ENCOUNTER — VBI (OUTPATIENT)
Dept: ADMINISTRATIVE | Facility: OTHER | Age: 63
End: 2023-05-15

## 2023-09-19 ENCOUNTER — OFFICE VISIT (OUTPATIENT)
Dept: OBGYN CLINIC | Facility: CLINIC | Age: 63
End: 2023-09-19
Payer: COMMERCIAL

## 2023-09-19 VITALS
WEIGHT: 274 LBS | HEART RATE: 109 BPM | HEIGHT: 65 IN | DIASTOLIC BLOOD PRESSURE: 90 MMHG | OXYGEN SATURATION: 98 % | SYSTOLIC BLOOD PRESSURE: 132 MMHG | BODY MASS INDEX: 45.65 KG/M2

## 2023-09-19 DIAGNOSIS — M17.11 PRIMARY OSTEOARTHRITIS OF RIGHT KNEE: Primary | ICD-10-CM

## 2023-09-19 PROCEDURE — 99213 OFFICE O/P EST LOW 20 MIN: CPT | Performed by: ORTHOPAEDIC SURGERY

## 2023-09-19 NOTE — PROGRESS NOTES
Orthopedics          Jono Raines 60 y.o. female MRN: [id number]      Chief Complaint:   right knee pain    HPI:   60 y. o.female complaining of right knee pain. Patient presents office today regarding right knee pain. Patient states that the pain is localized to her right knee worse with activity worse with weightbearing. Patient has been working on weight loss. She is wishing to have a right total knee arthroplasty near future. She is limited by her right knee pain. She denies any instability clicking or radiation pain denies any numbness and tingling in her right lower extremity. Review Of Systems:   · Skin: Normal  · Neuro: See HPI  · Musculoskeletal: See HPI  · All other systems reviewed and are negative    Past Medical History:   Past Medical History:   Diagnosis Date   • Allergic     pollen   • Arthritis approx.  2015    right knee   • Disease of thyroid gland    • Obesity lifelong       Past Surgical History:   Past Surgical History:   Procedure Laterality Date   • ANKLE SURGERY      s/p bone graft to talus due to talar cyst       Family History:  Family history reviewed and non-contributory  Family History   Problem Relation Age of Onset   • Mental illness Mother         Borderline personality disorder   • Diabetes Mother    • Stroke Mother    • Eating disorder Mother    • Hypertension Mother    • Psychiatric Illness Mother         BPD   • Cancer Father         penile cancer   • Hypertension Father    • No Known Problems Brother    • Eating disorder Maternal Grandmother    • Glaucoma Maternal Grandmother          Social History:  Social History     Socioeconomic History   • Marital status: /Civil Union     Spouse name: None   • Number of children: None   • Years of education: None   • Highest education level: None   Occupational History   • None   Tobacco Use   • Smoking status: Never   • Smokeless tobacco: Never   Vaping Use   • Vaping Use: Never used   Substance and Sexual Activity   • Alcohol use: Yes     Alcohol/week: 8.0 standard drinks of alcohol     Types: 4 Glasses of wine, 4 Standard drinks or equivalent per week     Comment: rare   • Drug use: Not Currently     Types: Amphetamines, Barbiturates, Hashish, Marijuana     Comment: Late 70's, early 80's - stopped on my own   • Sexual activity: Not Currently     Partners: Male     Birth control/protection: Post-menopausal   Other Topics Concern   • None   Social History Narrative   • None     Social Determinants of Health     Financial Resource Strain: Not on file   Food Insecurity: Not on file   Transportation Needs: Not on file   Physical Activity: Not on file   Stress: Not on file   Social Connections: Not on file   Intimate Partner Violence: Not on file   Housing Stability: Not on file       Allergies:    Allergies   Allergen Reactions   • Dust Mite Extract Cough   • Molds & Smuts Cough   • Pollen Extract Cough       Labs:  0   Lab Value Date/Time    HCT 32.8 (L) 04/24/2023 0827    HCT 34.4 08/09/2022 0918    HCT 34.8 04/07/2021 1125    HGB 11.3 04/24/2023 0827    HGB 11.5 08/09/2022 0918    HGB 11.7 04/07/2021 1125    WBC 9.9 04/24/2023 0827    WBC 10.5 08/09/2022 0918    WBC 11.7 (H) 04/07/2021 1125       Meds:    Current Outpatient Medications:   •  Albuterol Sulfate 108 (90 Base) MCG/ACT AEPB, Inhale 1 puff, Disp: , Rfl:   •  ALPRAZolam (Xanax) 0.5 mg tablet, Take 1 tablet (0.5 mg total) by mouth daily as needed for anxiety, Disp: 30 tablet, Rfl: 0  •  Ascorbic Acid 500 MG CAPS, Take 2,000 mg by mouth daily  , Disp: , Rfl:   •  Biotin w/ Vitamins C & E (HAIR SKIN & NAILS GUMMIES PO), Take by mouth, Disp: , Rfl:   •  Cholecalciferol (Vitamin D3) 50 MCG (2000 UT) capsule, 4,000 Units  , Disp: , Rfl:   •  levothyroxine (Euthyrox) 175 mcg tablet, Take 1 tablet (175 mcg total) by mouth daily in the early morning, Disp: 90 tablet, Rfl: 3  •  lisinopril (ZESTRIL) 10 mg tablet, TAKE 1 TABLET BY MOUTH TWICE A DAY, Disp: 180 tablet, Rfl: 1  •  nortriptyline (PAMELOR) 10 mg capsule, TAKE 4 PO HS., Disp: 120 capsule, Rfl: 4  •  diazepam (VALIUM) 10 mg tablet, Uses once only prior to dental procedures  (Patient not taking: Reported on 5/2/2023), Disp: , Rfl:   •  EPINEPHrine (EPIPEN) 0.3 mg/0.3 mL SOAJ, Inject 0.3 mL (0.3 mg total) into a muscle once for 1 dose, Disp: 0.6 mL, Rfl: 0  •  triazolam (HALCION) 0.25 MG tablet, 0.25 mg Uses just prior to dental procedures  (Patient not taking: Reported on 5/2/2023), Disp: , Rfl:       Physical Exam:   Vitals:    09/19/23 1550   BP: 132/90   Pulse: (!) 109   SpO2: 98%       General Appearance:    Alert, cooperative, no distress, appears stated age   Head:    Normocephalic, without obvious abnormality, atraumatic   Eyes:    conjunctiva/corneas clear, both eyes        Nose:   Nares normal, septum midline, no drainage    Throat:   Lips normal; teeth and gums normal   Neck:    symmetrical, trachea midline, ;     thyroid:  no enlargement/   Back:     Symmetric, no curvature, ROM normal   Lungs:   No audible wheezing or labored breathing   Chest Wall:    No tenderness or deformity    Heart:    Regular rate and rhythm               Pulses:   2+ and symmetric all extremities   Skin:   Skin color, texture, turgor normal, no rashes or lesions   Neurologic:   normal strength, sensation and reflexes     throughout       Musculoskeletal: right lower extremity  · On examination of the right knee there is no effusion, no erythema. Range of motion to full active extension and flexion to greater than 120°. Pain on palpation medial and lateral joint lines. There is crepitus with range of motion, no warmth to palpation, bony enlargement noted. No pain on palpation pes anserine bursa region or distal iliotibial band. Stable to varus and valgus stress without pain or gapping. Negative anterior and posterior drawer testing.   Sensation intact distal pulses present.        _*_*_*_*_*_*_*_*_*_*_*_*_*_*_*_*_*_*_*_*_*_*_*_*_*_*_*_*_*_*_*_*_*_*_*_*_*_*_*_*_*    Assessment:  60 y. o.female with right knee pain due to arthritis    Plan:   · Weight bearing as tolerated  right lower extremity  · Case discussed with Dr. Ronda Najera  · Advised patient she needs to lose 20-25 pounds prior to her right total knee arthroplasty  · Patient is aware of this and is willing to pick out a surgery date to have a goal  · Follow-up in 2 months time repeat weight check and possible scheduling right total knee arthroplasty        Paula Olmedo PA-C

## 2023-09-28 DIAGNOSIS — E66.01 CLASS 3 SEVERE OBESITY DUE TO EXCESS CALORIES WITH SERIOUS COMORBIDITY AND BODY MASS INDEX (BMI) OF 45.0 TO 49.9 IN ADULT (HCC): Primary | ICD-10-CM

## 2023-10-16 ENCOUNTER — OFFICE VISIT (OUTPATIENT)
Dept: PAIN MEDICINE | Facility: CLINIC | Age: 63
End: 2023-10-16
Payer: COMMERCIAL

## 2023-10-16 VITALS
SYSTOLIC BLOOD PRESSURE: 125 MMHG | HEIGHT: 65 IN | TEMPERATURE: 98.1 F | BODY MASS INDEX: 45.82 KG/M2 | WEIGHT: 275 LBS | DIASTOLIC BLOOD PRESSURE: 88 MMHG

## 2023-10-16 DIAGNOSIS — M79.2 NEUROPATHIC PAIN: ICD-10-CM

## 2023-10-16 DIAGNOSIS — M17.11 PRIMARY OSTEOARTHRITIS OF RIGHT KNEE: ICD-10-CM

## 2023-10-16 DIAGNOSIS — G89.29 CHRONIC PAIN OF RIGHT KNEE: ICD-10-CM

## 2023-10-16 DIAGNOSIS — M25.561 CHRONIC PAIN OF RIGHT KNEE: ICD-10-CM

## 2023-10-16 DIAGNOSIS — G89.4 CHRONIC PAIN SYNDROME: Primary | ICD-10-CM

## 2023-10-16 PROCEDURE — 99213 OFFICE O/P EST LOW 20 MIN: CPT | Performed by: PHYSICIAN ASSISTANT

## 2023-10-16 RX ORDER — NORTRIPTYLINE HYDROCHLORIDE 10 MG/1
CAPSULE ORAL
Qty: 120 CAPSULE | Refills: 4 | Status: SHIPPED | OUTPATIENT
Start: 2023-10-16

## 2023-10-16 NOTE — PROGRESS NOTES
Assessment:  1. Chronic pain syndrome    2. Primary osteoarthritis of right knee    3. Chronic pain of right knee    4. Neuropathic pain        Plan:  While the patient was in the office today, I did have a thorough conversation regarding their chronic pain syndrome, medication management, and treatment plan options. The patient remains clinically stable on the current medication regimen of nortriptyline 40 mg nightly. It significantly reduces her pain that keeps her awake at night. Unfortunately she does feel groggy the following morning. I have recommended that she try reducing to 30 mg nightly. She was agreeable to trying this and even reducing further if able to. I have sent refills to her pharmacy and she will be seen in 6 months or sooner if needed. The patient will follow-up in 6 months for medication prescription refill and reevaluation. The patient was advised to contact the office should their symptoms worsen in the interim. The patient was agreeable and verbalized an understanding. History of Present Illness: The patient is a 58 y.o. female last seen on 05/02/2023 who presents for a follow up office visit in regards to chronic pain secondary to severe osteoarthritis of the right knee. The patient currently reports right knee pain that she presently rates a 6 out of 10 and describes it as an intermittent dull, aching and sharp pain all around the joint. She has been maintained on nortriptyline to alleviate her knee pain which affects her ability to sleep. She finds it very helpful however she feels groggy the following morning. She is still working on weight loss efforts in anticipation of total knee arthoplasty. She was told that she needs to lose about 20 more pounds. Current pain medications includes: Nortriptyline. The patient reports that this regimen is providing 50% pain relief.   The patient is reporting no side effects, sleepiness from this pain medication regimen. I have personally reviewed and/or updated the patient's past medical history, past surgical history, family history, social history, current medications, allergies, and vital signs today. Review of Systems:    Review of Systems   Respiratory:  Negative for shortness of breath. Cardiovascular:  Negative for chest pain. Gastrointestinal:  Negative for constipation, diarrhea, nausea and vomiting. Musculoskeletal:  Positive for gait problem. Negative for arthralgias, joint swelling and myalgias. Skin:  Negative for rash. Neurological:  Negative for dizziness, seizures and weakness. All other systems reviewed and are negative. Past Medical History:   Diagnosis Date   • Allergic     pollen   • Anxiety    • Arthritis approx.  2015    right knee   • Asthma 2016   • Disease of thyroid gland    • Hypertension 2016   • Obesity lifelong   • Osteoarthritis        Past Surgical History:   Procedure Laterality Date   • ANKLE SURGERY      s/p bone graft to talus due to talar cyst   • ORTHOPEDIC SURGERY  2020       Family History   Problem Relation Age of Onset   • Mental illness Mother         Borderline personality disorder   • Diabetes Mother    • Stroke Mother    • Eating disorder Mother    • Hypertension Mother    • Psychiatric Illness Mother         BPD   • Cancer Father         penile cancer   • Hypertension Father    • No Known Problems Brother    • Eating disorder Maternal Grandmother    • Glaucoma Maternal Grandmother    • Stroke Maternal Grandmother         very heavy smoker   • Alcohol abuse Maternal Uncle          mid'30's of alcoholism       Social History     Occupational History   • Not on file   Tobacco Use   • Smoking status: Never   • Smokeless tobacco: Never   Vaping Use   • Vaping Use: Never used   Substance and Sexual Activity   • Alcohol use: Not Currently     Alcohol/week: 2.0 standard drinks of alcohol     Types: 2 Glasses of wine per week     Comment: 0-2 glasses wine/cocktails per month   • Drug use: Not Currently     Types: Amphetamines, Barbiturates, Hashish, Marijuana     Comment: Late 70's, early 80's - stopped on my own   • Sexual activity: Not Currently     Partners: Male     Birth control/protection: Post-menopausal         Current Outpatient Medications:   •  Albuterol Sulfate 108 (90 Base) MCG/ACT AEPB, Inhale 1 puff, Disp: , Rfl:   •  ALPRAZolam (Xanax) 0.5 mg tablet, Take 1 tablet (0.5 mg total) by mouth daily as needed for anxiety, Disp: 30 tablet, Rfl: 0  •  Ascorbic Acid 500 MG CAPS, Take 2,000 mg by mouth daily  , Disp: , Rfl:   •  Biotin w/ Vitamins C & E (HAIR SKIN & NAILS GUMMIES PO), Take by mouth, Disp: , Rfl:   •  Cholecalciferol (Vitamin D3) 50 MCG (2000 UT) capsule, 4,000 Units  , Disp: , Rfl:   •  levothyroxine (Euthyrox) 175 mcg tablet, Take 1 tablet (175 mcg total) by mouth daily in the early morning, Disp: 90 tablet, Rfl: 3  •  lisinopril (ZESTRIL) 10 mg tablet, TAKE 1 TABLET BY MOUTH TWICE A DAY, Disp: 180 tablet, Rfl: 1  •  nortriptyline (PAMELOR) 10 mg capsule, TAKE 4 PO HS., Disp: 120 capsule, Rfl: 4  •  diazepam (VALIUM) 10 mg tablet, Uses once only prior to dental procedures  (Patient not taking: Reported on 5/2/2023), Disp: , Rfl:   •  EPINEPHrine (EPIPEN) 0.3 mg/0.3 mL SOAJ, Inject 0.3 mL (0.3 mg total) into a muscle once for 1 dose, Disp: 0.6 mL, Rfl: 0  •  triazolam (HALCION) 0.25 MG tablet, 0.25 mg Uses just prior to dental procedures  (Patient not taking: Reported on 5/2/2023), Disp: , Rfl:     Allergies   Allergen Reactions   • Dust Mite Extract Cough   • Molds & Smuts Cough   • Pollen Extract Cough       Physical Exam:    /88 (BP Location: Left arm, Patient Position: Sitting, Cuff Size: Adult)   Temp 98.1 °F (36.7 °C)   Ht 5' 5" (1.651 m) Comment: verbal  Wt 125 kg (275 lb)   BMI 45.76 kg/m²     Constitutional:normal, well developed, well nourished, alert, in no distress and non-toxic and no overt pain behavior.  and overweight  Eyes:anicteric  HEENT:grossly intact  Neck:supple, symmetric, trachea midline and no masses   Pulmonary:even and unlabored  Cardiovascular:No edema or pitting edema present  Skin:Normal without rashes or lesions and well hydrated  Psychiatric:Mood and affect appropriate  Neurologic:Cranial Nerves II-XII grossly intact  Musculoskeletal:normal      Imaging  No orders to display         No orders of the defined types were placed in this encounter.

## 2023-11-28 ENCOUNTER — TELEPHONE (OUTPATIENT)
Dept: OBGYN CLINIC | Facility: CLINIC | Age: 63
End: 2023-11-28

## 2023-11-28 NOTE — TELEPHONE ENCOUNTER
M for pt to discuss surgery hold date for 1/4/24 with Dr. Syl Major. She was to follow up on 11/21/23 for a weight check but was cancelled by the patient via [employer]. Asked to return my call to discuss.

## 2023-11-30 DIAGNOSIS — I10 BENIGN ESSENTIAL HTN: ICD-10-CM

## 2023-11-30 RX ORDER — LISINOPRIL 10 MG/1
TABLET ORAL
Qty: 60 TABLET | Refills: 0 | Status: SHIPPED | OUTPATIENT
Start: 2023-11-30

## 2023-12-27 DIAGNOSIS — F41.1 GENERALIZED ANXIETY DISORDER: ICD-10-CM

## 2023-12-27 RX ORDER — ALPRAZOLAM 0.5 MG/1
0.5 TABLET ORAL DAILY PRN
Qty: 30 TABLET | Refills: 0 | Status: SHIPPED | OUTPATIENT
Start: 2023-12-27

## 2024-02-12 ENCOUNTER — OFFICE VISIT (OUTPATIENT)
Dept: FAMILY MEDICINE CLINIC | Facility: CLINIC | Age: 64
End: 2024-02-12
Payer: COMMERCIAL

## 2024-02-12 VITALS
SYSTOLIC BLOOD PRESSURE: 142 MMHG | TEMPERATURE: 97.6 F | RESPIRATION RATE: 16 BRPM | BODY MASS INDEX: 45.76 KG/M2 | OXYGEN SATURATION: 100 % | HEIGHT: 65 IN | DIASTOLIC BLOOD PRESSURE: 88 MMHG | HEART RATE: 90 BPM

## 2024-02-12 DIAGNOSIS — E03.8 OTHER SPECIFIED HYPOTHYROIDISM: ICD-10-CM

## 2024-02-12 DIAGNOSIS — J45.30 MILD PERSISTENT ASTHMA WITHOUT COMPLICATION: ICD-10-CM

## 2024-02-12 DIAGNOSIS — E66.01 CLASS 3 SEVERE OBESITY DUE TO EXCESS CALORIES WITH SERIOUS COMORBIDITY AND BODY MASS INDEX (BMI) OF 45.0 TO 49.9 IN ADULT (HCC): ICD-10-CM

## 2024-02-12 DIAGNOSIS — Z13.220 LIPID SCREENING: ICD-10-CM

## 2024-02-12 DIAGNOSIS — M17.11 PRIMARY OSTEOARTHRITIS OF RIGHT KNEE: ICD-10-CM

## 2024-02-12 DIAGNOSIS — I10 BENIGN ESSENTIAL HTN: ICD-10-CM

## 2024-02-12 DIAGNOSIS — F41.1 GENERALIZED ANXIETY DISORDER: Primary | ICD-10-CM

## 2024-02-12 PROCEDURE — 99214 OFFICE O/P EST MOD 30 MIN: CPT | Performed by: NURSE PRACTITIONER

## 2024-02-12 RX ORDER — ALPRAZOLAM 0.5 MG/1
0.5 TABLET ORAL DAILY PRN
Qty: 30 TABLET | Refills: 1 | Status: SHIPPED | OUTPATIENT
Start: 2024-02-12

## 2024-02-12 RX ORDER — LEVOTHYROXINE SODIUM 175 UG/1
175 TABLET ORAL
Qty: 90 TABLET | Refills: 3 | Status: SHIPPED | OUTPATIENT
Start: 2024-02-12

## 2024-02-12 RX ORDER — LISINOPRIL 10 MG/1
10 TABLET ORAL 2 TIMES DAILY
Qty: 180 TABLET | Refills: 3 | Status: SHIPPED | OUTPATIENT
Start: 2024-02-12

## 2024-02-12 NOTE — PROGRESS NOTES
[facility] PRACTICE OFFICE VISIT       NAME: Chio Raines  AGE: 63 y.o. SEX: female       : 1960        MRN: [id number]    Assessment and Plan   1. Generalized anxiety disorder  Assessment & Plan:  Stable with rare use of alprazolam. Following with a counselor through [employer].     Orders:  -     CBC; Future  -     Comprehensive metabolic panel; Future  -     Cortisol Level, AM Specimen; Future  -     CBC  -     Comprehensive metabolic panel  -     Cortisol Level, AM Specimen  -     ALPRAZolam (Xanax) 0.5 mg tablet; Take 1 tablet (0.5 mg total) by mouth daily as needed for anxiety    2. Benign essential HTN  Assessment & Plan:  BP elevated in office today.   Home blood pressures are at goal 120's/70's. Today home /73. Continue lisinopril.     Orders:  -     CBC; Future  -     Comprehensive metabolic panel; Future  -     Cortisol Level, AM Specimen; Future  -     CBC  -     Comprehensive metabolic panel  -     Cortisol Level, AM Specimen  -     lisinopril (ZESTRIL) 10 mg tablet; Take 1 tablet (10 mg total) by mouth 2 (two) times a day    3. Mild persistent asthma without complication  Assessment & Plan:  Rare use of albuterol, use is typically associated with seasonal allergies.       4. Other specified hypothyroidism  Assessment & Plan:  Continue current dose of levothyroxine, due for repeat TSH.     Orders:  -     TSH, 3rd generation; Future  -     TSH, 3rd generation  -     levothyroxine (Euthyrox) 175 mcg tablet; Take 1 tablet (175 mcg total) by mouth daily in the early morning    5. Primary osteoarthritis of right knee  Assessment & Plan:  Planning for right knee replacement. Recently to orthopedics, despite dropping BMI from 50 to 45, she was advised to lose another 20-25 pounds prior to surgery.       6. Class 3 severe obesity due to excess calories with serious comorbidity and body mass index (BMI) of 45.0 to 49.9 in adult (HCC)  Assessment & Plan:  BMI down from 50 to 45.   Continuing to  work on weight loss.   [facility].   Joined [employer] and [employer] programs recently.       7. Lipid screening  -     Lipid panel; Future  -     Lipid panel       Complete blood work. Annual physical in April.    Chief Complaint     Chief Complaint   Patient presents with    Follow-up       History of Present Illness     Chio Raines is a 63 year old female presenting today for check up on chronic conditions.     Has been working hard on weight loss. Weight was up to 301 pounds in 2021, down to 275 now.   Little set back this winter, ate and drank more than usual. Less exercise.   Got upset because she is trying to lose weight for knee replacement, was told her goal was a BMI <45 to have surgery. She has nearly attained this goal, and at last orthopedics appointment she was told she has to lose an additional 20-25 pounds to have surgery.     Getting back on track now.   Has a new counselor through [employer] that has been a good fit.   Recently started [employer] program for addressing weight loss, sleep, moving more, stress management.     Still swimming, and doing water exercise 3 days per week, weights 2 days per week.   [facility]   Also started [employer] dance program.     Blood pressure at home today 132/73. Usually runs 120's/70's.     Albuterol few times--spring allergies starting already.   is a . Bees already bringing in pollen from trees.    Alprazolam needs refill .  Good to have on hand.  Doesn't use often.     Considering weaning off northyrptylline--gets bad dreams, nightmares 1-2 per night.                 Review of Systems   Review of Systems   Constitutional: Negative.    HENT: Negative.     Respiratory: Negative.     Cardiovascular: Negative.    Gastrointestinal: Negative.    Genitourinary: Negative.    Musculoskeletal:  Positive for arthralgias.   Skin: Negative.    Neurological: Negative.   "  Psychiatric/Behavioral: Negative.         I have reviewed the patient's medical history in detail; there are no changes to the history as noted in the electronic medical record.    Objective     Vitals:    02/12/24 1155   BP: 142/88   BP Location: Left arm   Patient Position: Sitting   Cuff Size: Large   Pulse: 90   Resp: 16   Temp: 97.6 °F (36.4 °C)   TempSrc: Temporal   SpO2: 100%   Height: 5' 5\" (1.651 m)     Wt Readings from Last 3 Encounters:   10/16/23 125 kg (275 lb)   09/19/23 124 kg (274 lb)   05/02/23 124 kg (273 lb)     Physical Exam  Vitals and nursing note reviewed.   Constitutional:       General: She is not in acute distress.     Appearance: Normal appearance. She is not ill-appearing.   Cardiovascular:      Rate and Rhythm: Normal rate and regular rhythm.      Heart sounds: No murmur heard.  Pulmonary:      Effort: Pulmonary effort is normal. No respiratory distress.      Breath sounds: Normal breath sounds.   Musculoskeletal:      Right lower leg: No edema.      Left lower leg: No edema.   Neurological:      Mental Status: She is alert.   Psychiatric:         Mood and Affect: Mood normal.         Depression Screening and Follow-up Plan: Patient was screened for depression during today's encounter. They screened negative with a PHQ-2 score of 0.        ALLERGIES:  Allergies   Allergen Reactions    Dust Mite Extract Cough    Molds & Smuts Cough    Pollen Extract Cough       Current Medications     Current Outpatient Medications   Medication Sig Dispense Refill    Albuterol Sulfate 108 (90 Base) MCG/ACT AEPB Inhale 1 puff      ALPRAZolam (Xanax) 0.5 mg tablet Take 1 tablet (0.5 mg total) by mouth daily as needed for anxiety 30 tablet 1    Ascorbic Acid 500 MG CAPS Take 2,000 mg by mouth daily        Biotin w/ Vitamins C & E (HAIR SKIN & NAILS GUMMIES PO) Take by mouth      Cholecalciferol (Vitamin D3) 50 MCG (2000 UT) capsule 4,000 Units        EPINEPHrine (EPIPEN) 0.3 mg/0.3 mL SOAJ Inject 0.3 mL " (0.3 mg total) into a muscle once for 1 dose 0.6 mL 0    levothyroxine (Euthyrox) 175 mcg tablet Take 1 tablet (175 mcg total) by mouth daily in the early morning 90 tablet 3    lisinopril (ZESTRIL) 10 mg tablet Take 1 tablet (10 mg total) by mouth 2 (two) times a day 180 tablet 3    nortriptyline (PAMELOR) 10 mg capsule TAKE 4 PO HS. (Patient taking differently: TAKE 3 PO HS.) 120 capsule 4     No current facility-administered medications for this visit.         Health Maintenance     Health Maintenance   Topic Date Due    Pneumococcal Vaccine: Pediatrics (0 to 5 Years) and At-Risk Patients (6 to 64 Years) (1 - PCV) Never done    HIV Screening  Never done    Breast Cancer Screening: Mammogram  Never done    Zoster Vaccine (1 of 2) Never done    Cervical Cancer Screening  11/21/2023    Colorectal Cancer Screening  02/08/2024    Annual Physical  04/25/2024    COVID-19 Vaccine (6 - 2023-24 season) 03/08/2024    Depression Screening  02/12/2025    DTaP,Tdap,and Td Vaccines (2 - Td or Tdap) 01/08/2031    Hepatitis C Screening  Completed    Influenza Vaccine  Completed    HIB Vaccine  Aged Out    IPV Vaccine  Aged Out    Hepatitis A Vaccine  Aged Out    Meningococcal ACWY Vaccine  Aged Out    HPV Vaccine  Aged Out     Immunization History   Administered Date(s) Administered    COVID-19 MODERNA VACC 0.5 ML IM 11/30/2022    COVID-19 PFIZER VACCINE 0.3 ML IM 03/29/2021, 04/23/2021, 12/17/2021    COVID-19, unspecified 01/12/2024    INFLUENZA 11/30/2022, 01/12/2024    Tdap 01/08/2021       EVANGELINA Hernandez

## 2024-02-13 NOTE — ASSESSMENT & PLAN NOTE
BP elevated in office today.   Home blood pressures are at goal 120's/70's. Today home /73. Continue lisinopril.

## 2024-02-13 NOTE — ASSESSMENT & PLAN NOTE
Planning for right knee replacement. Recently to orthopedics, despite dropping BMI from 50 to 45, she was advised to lose another 20-25 pounds prior to surgery.

## 2024-02-13 NOTE — ASSESSMENT & PLAN NOTE
BMI down from 50 to 45.   Continuing to work on weight loss.   [facility] Does [facility] twice weekly.   Joined [employer] and [employer] programs recently.

## 2024-03-25 ENCOUNTER — OFFICE VISIT (OUTPATIENT)
Dept: PAIN MEDICINE | Facility: CLINIC | Age: 64
End: 2024-03-25
Payer: COMMERCIAL

## 2024-03-25 VITALS
HEART RATE: 88 BPM | SYSTOLIC BLOOD PRESSURE: 130 MMHG | HEIGHT: 65 IN | WEIGHT: 284 LBS | TEMPERATURE: 98.3 F | DIASTOLIC BLOOD PRESSURE: 82 MMHG | BODY MASS INDEX: 47.32 KG/M2

## 2024-03-25 DIAGNOSIS — G89.29 CHRONIC PAIN OF RIGHT KNEE: ICD-10-CM

## 2024-03-25 DIAGNOSIS — M25.561 CHRONIC PAIN OF RIGHT KNEE: ICD-10-CM

## 2024-03-25 DIAGNOSIS — M17.11 PRIMARY OSTEOARTHRITIS OF RIGHT KNEE: Primary | ICD-10-CM

## 2024-03-25 PROCEDURE — 99214 OFFICE O/P EST MOD 30 MIN: CPT | Performed by: PHYSICIAN ASSISTANT

## 2024-03-25 NOTE — PROGRESS NOTES
Assessment:  1. Primary osteoarthritis of right knee    2. Chronic pain of right knee        Plan:  While the patient was in the office today, I did have a thorough conversation regarding their chronic pain syndrome, medication management, and treatment plan options.    The patient remains clinically stable and well-controlled on the nortriptyline typically taking 30 mg nightly.  I feel it is reasonable to continue this medication.  I have electronically sent this to her pharmacy.  I still wrote the prescription for her to take 40 mg if needed on more severe pain days.    She will continue to work on weight loss efforts in anticipation of right total knee arthroplasty.    The patient will follow-up in 6 months for medication prescription refill and reevaluation. The patient was advised to contact the office should their symptoms worsen in the interim. The patient was agreeable and verbalized an understanding.        History of Present Illness:    The patient is a 63 y.o. female last seen on 10/16/2023 who presents for a follow up office visit in regards to chronic right knee pain secondary to advanced osteoarthritis.  The patient currently reports right knee pain that she presently rates a 5 out of 10 and describes it as an intermittent burning, dull, aching and sharp type of pain.  Pain is significantly worse with standing or walking.  She continues to work on weight loss efforts in anticipation of a future right total knee arthroplasty.  She continues to exercise in the pool on a regular basis.  She is working with a nutrition program through her insurance company.  Patient also states that she sees her mental health therapist once a week.    Current pain medications includes: Nortriptyline  ?.  The patient reports that this regimen is providing 50% pain relief.  The patient is reporting no side effects from this pain medication regimen.    I have personally reviewed and/or updated the patient's past medical  fall history, past surgical history, family history, social history, current medications, allergies, and vital signs today.       Review of Systems:    Review of Systems   Respiratory:  Negative for shortness of breath.    Cardiovascular:  Negative for chest pain.   Gastrointestinal:  Negative for constipation, diarrhea, nausea and vomiting.   Musculoskeletal:  Positive for gait problem and joint swelling. Negative for arthralgias and myalgias.   Skin:  Negative for rash.   Neurological:  Negative for dizziness, seizures and weakness.   All other systems reviewed and are negative.        Past Medical History:   Diagnosis Date   • Allergic     pollen   • Anxiety    • Arthritis approx. 2015    right knee   • Asthma 2016   • Disease of thyroid gland    • Hypertension 2016   • Obesity lifelong   • Osteoarthritis        Past Surgical History:   Procedure Laterality Date   • ANKLE SURGERY      s/p bone graft to talus due to talar cyst   • ORTHOPEDIC SURGERY  2020       Family History   Problem Relation Age of Onset   • Mental illness Mother         Borderline personality disorder   • Diabetes Mother    • Stroke Mother    • Eating disorder Mother    • Hypertension Mother    • Psychiatric Illness Mother         BPD   • Cancer Father         penile cancer   • Hypertension Father    • No Known Problems Brother    • Eating disorder Maternal Grandmother    • Glaucoma Maternal Grandmother    • Stroke Maternal Grandmother         very heavy smoker   • Alcohol abuse Maternal Uncle          mid'30's of alcoholism       Social History     Occupational History   • Not on file   Tobacco Use   • Smoking status: Never   • Smokeless tobacco: Never   Vaping Use   • Vaping status: Never Used   Substance and Sexual Activity   • Alcohol use: Not Currently     Alcohol/week: 2.0 standard drinks of alcohol     Types: 2 Glasses of wine per week     Comment: 0-2 glasses wine/cocktails per month   • Drug use: Not Currently     Types:  "Amphetamines, Barbiturates, Hashish, Marijuana     Comment: Late 70's, early 80's - stopped on my own   • Sexual activity: Not Currently     Partners: Male     Birth control/protection: Post-menopausal         Current Outpatient Medications:   •  Albuterol Sulfate 108 (90 Base) MCG/ACT AEPB, Inhale 1 puff, Disp: , Rfl:   •  ALPRAZolam (Xanax) 0.5 mg tablet, Take 1 tablet (0.5 mg total) by mouth daily as needed for anxiety, Disp: 30 tablet, Rfl: 1  •  Ascorbic Acid 500 MG CAPS, Take 2,000 mg by mouth daily  , Disp: , Rfl:   •  Biotin w/ Vitamins C & E (HAIR SKIN & NAILS GUMMIES PO), Take by mouth, Disp: , Rfl:   •  Cholecalciferol (Vitamin D3) 50 MCG (2000 UT) capsule, 4,000 Units  , Disp: , Rfl:   •  levothyroxine (Euthyrox) 175 mcg tablet, Take 1 tablet (175 mcg total) by mouth daily in the early morning, Disp: 90 tablet, Rfl: 3  •  lisinopril (ZESTRIL) 10 mg tablet, Take 1 tablet (10 mg total) by mouth 2 (two) times a day, Disp: 180 tablet, Rfl: 3  •  nortriptyline (PAMELOR) 10 mg capsule, TAKE 4 PO HS., Disp: 120 capsule, Rfl: 5  •  EPINEPHrine (EPIPEN) 0.3 mg/0.3 mL SOAJ, Inject 0.3 mL (0.3 mg total) into a muscle once for 1 dose, Disp: 0.6 mL, Rfl: 0    Allergies   Allergen Reactions   • Dust Mite Extract Cough   • Molds & Smuts Cough   • Pollen Extract Cough       Physical Exam:    /82 (BP Location: Left arm, Patient Position: Sitting, Cuff Size: Large)   Pulse 88   Temp 98.3 °F (36.8 °C)   Ht 5' 5\" (1.651 m)   Wt 129 kg (284 lb)   BMI 47.26 kg/m²     Constitutional:normal, well developed, well nourished, alert, in no distress and non-toxic and no overt pain behavior. and obese  Eyes:anicteric  HEENT:grossly intact  Neck:supple, symmetric, trachea midline and no masses   Pulmonary:even and unlabored  Cardiovascular:No edema or pitting edema present  Skin:Normal without rashes or lesions and well hydrated  Psychiatric:Mood and affect appropriate  Neurologic:Cranial Nerves II-XII grossly " intact  Musculoskeletal: Antalgic gait      Imaging  No orders to display         No orders of the defined types were placed in this encounter.

## 2024-04-03 DIAGNOSIS — E03.8 OTHER SPECIFIED HYPOTHYROIDISM: ICD-10-CM

## 2024-04-03 RX ORDER — LEVOTHYROXINE SODIUM 175 UG/1
175 TABLET ORAL
Qty: 90 TABLET | Refills: 4 | Status: SHIPPED | OUTPATIENT
Start: 2024-04-03

## 2024-04-27 DIAGNOSIS — E03.8 OTHER SPECIFIED HYPOTHYROIDISM: ICD-10-CM

## 2024-04-29 RX ORDER — LEVOTHYROXINE SODIUM 175 UG/1
175 TABLET ORAL
Qty: 90 TABLET | Refills: 3 | Status: SHIPPED | OUTPATIENT
Start: 2024-04-29

## 2024-05-07 ENCOUNTER — VBI (OUTPATIENT)
Dept: ADMINISTRATIVE | Facility: OTHER | Age: 64
End: 2024-05-07

## 2024-06-02 DIAGNOSIS — E03.8 OTHER SPECIFIED HYPOTHYROIDISM: ICD-10-CM

## 2024-06-03 RX ORDER — LEVOTHYROXINE SODIUM 175 UG/1
175 TABLET ORAL
Qty: 90 TABLET | Refills: 4 | Status: SHIPPED | OUTPATIENT
Start: 2024-06-03

## 2024-06-28 DIAGNOSIS — E03.8 OTHER SPECIFIED HYPOTHYROIDISM: ICD-10-CM

## 2024-06-28 RX ORDER — LEVOTHYROXINE SODIUM 175 UG/1
175 TABLET ORAL
Qty: 30 TABLET | Refills: 0 | Status: SHIPPED | OUTPATIENT
Start: 2024-06-28

## 2024-08-14 DIAGNOSIS — E03.8 OTHER SPECIFIED HYPOTHYROIDISM: ICD-10-CM

## 2024-08-15 RX ORDER — LEVOTHYROXINE SODIUM 175 UG/1
175 TABLET ORAL
Qty: 90 TABLET | Refills: 0 | Status: SHIPPED | OUTPATIENT
Start: 2024-08-15

## 2024-08-26 DIAGNOSIS — Z12.31 SCREENING MAMMOGRAM FOR BREAST CANCER: Primary | ICD-10-CM

## 2024-09-12 ENCOUNTER — TELEPHONE (OUTPATIENT)
Dept: FAMILY MEDICINE CLINIC | Facility: CLINIC | Age: 64
End: 2024-09-12

## 2024-09-12 DIAGNOSIS — E66.01 CLASS 3 SEVERE OBESITY DUE TO EXCESS CALORIES WITH SERIOUS COMORBIDITY AND BODY MASS INDEX (BMI) OF 45.0 TO 49.9 IN ADULT (HCC): Primary | ICD-10-CM

## 2024-09-12 NOTE — TELEPHONE ENCOUNTER
As per analy message copied below--please do referral.           Luz Booker - My hub and I have a nutritionist appointment today with Nya Hawk at [facility] in [place]. He has a referral from his primary (we're on the same insurance) but I forgot to get one. It's been quite a year...     If you can issue one sometime in the near future, great.

## 2024-10-05 DIAGNOSIS — E03.8 OTHER SPECIFIED HYPOTHYROIDISM: ICD-10-CM

## 2024-10-07 RX ORDER — LEVOTHYROXINE SODIUM 175 UG/1
175 TABLET ORAL
Qty: 90 TABLET | Refills: 1 | Status: SHIPPED | OUTPATIENT
Start: 2024-10-07

## 2024-11-12 DIAGNOSIS — F41.1 GENERALIZED ANXIETY DISORDER: ICD-10-CM

## 2024-11-12 RX ORDER — ALPRAZOLAM 0.5 MG
0.5 TABLET ORAL DAILY PRN
Qty: 30 TABLET | Refills: 0 | Status: SHIPPED | OUTPATIENT
Start: 2024-11-12

## 2025-04-07 DIAGNOSIS — E03.8 OTHER SPECIFIED HYPOTHYROIDISM: ICD-10-CM

## 2025-04-07 DIAGNOSIS — F41.1 GENERALIZED ANXIETY DISORDER: ICD-10-CM

## 2025-04-08 DIAGNOSIS — E03.8 OTHER SPECIFIED HYPOTHYROIDISM: ICD-10-CM

## 2025-04-09 RX ORDER — LEVOTHYROXINE SODIUM 175 UG/1
175 TABLET ORAL
Qty: 90 TABLET | Refills: 0 | Status: SHIPPED | OUTPATIENT
Start: 2025-04-09

## 2025-04-09 RX ORDER — LEVOTHYROXINE SODIUM 175 UG/1
175 TABLET ORAL
Qty: 90 TABLET | Refills: 1 | OUTPATIENT
Start: 2025-04-09

## 2025-04-09 RX ORDER — ALPRAZOLAM 0.5 MG
0.5 TABLET ORAL DAILY PRN
Qty: 30 TABLET | Refills: 0 | Status: SHIPPED | OUTPATIENT
Start: 2025-04-09

## 2025-04-09 NOTE — TELEPHONE ENCOUNTER
Patient scheduled herself for an annual physical 5/6/25. She stated she has 3 days left of her levothyroxine. Please call to advise if pt should need to go for blood work prior to her 5/6/25 physical.

## 2025-05-08 ENCOUNTER — VBI (OUTPATIENT)
Dept: ADMINISTRATIVE | Facility: OTHER | Age: 65
End: 2025-05-08

## 2025-05-08 NOTE — TELEPHONE ENCOUNTER
05/08/25 10:39 AM     Chart reviewed for Mammogram ; nothing is submitted to the patient's insurance at this time.     Duyen Jiménez   [facility]

## 2025-05-20 DIAGNOSIS — I10 BENIGN ESSENTIAL HTN: ICD-10-CM

## 2025-05-20 RX ORDER — LISINOPRIL 10 MG/1
10 TABLET ORAL 2 TIMES DAILY
Qty: 180 TABLET | Refills: 0 | Status: SHIPPED | OUTPATIENT
Start: 2025-05-20

## 2025-05-23 ENCOUNTER — OFFICE VISIT (OUTPATIENT)
Dept: FAMILY MEDICINE CLINIC | Facility: CLINIC | Age: 65
End: 2025-05-23
Payer: COMMERCIAL

## 2025-05-23 VITALS
DIASTOLIC BLOOD PRESSURE: 100 MMHG | HEART RATE: 74 BPM | RESPIRATION RATE: 16 BRPM | TEMPERATURE: 97.8 F | OXYGEN SATURATION: 100 % | HEIGHT: 65 IN | WEIGHT: 282 LBS | SYSTOLIC BLOOD PRESSURE: 150 MMHG | BODY MASS INDEX: 46.98 KG/M2

## 2025-05-23 DIAGNOSIS — M17.11 PRIMARY OSTEOARTHRITIS OF RIGHT KNEE: ICD-10-CM

## 2025-05-23 DIAGNOSIS — F41.1 GENERALIZED ANXIETY DISORDER: ICD-10-CM

## 2025-05-23 DIAGNOSIS — J45.30 MILD PERSISTENT ASTHMA WITHOUT COMPLICATION: ICD-10-CM

## 2025-05-23 DIAGNOSIS — G47.33 OBSTRUCTIVE SLEEP APNEA: ICD-10-CM

## 2025-05-23 DIAGNOSIS — E66.813 CLASS 3 SEVERE OBESITY DUE TO EXCESS CALORIES WITH SERIOUS COMORBIDITY AND BODY MASS INDEX (BMI) OF 45.0 TO 49.9 IN ADULT: ICD-10-CM

## 2025-05-23 DIAGNOSIS — E78.00 PURE HYPERCHOLESTEROLEMIA: ICD-10-CM

## 2025-05-23 DIAGNOSIS — Z12.11 COLON CANCER SCREENING: ICD-10-CM

## 2025-05-23 DIAGNOSIS — I10 BENIGN ESSENTIAL HTN: ICD-10-CM

## 2025-05-23 DIAGNOSIS — E03.8 OTHER SPECIFIED HYPOTHYROIDISM: ICD-10-CM

## 2025-05-23 DIAGNOSIS — Z13.1 DIABETES MELLITUS SCREENING: ICD-10-CM

## 2025-05-23 DIAGNOSIS — Z00.00 MEDICARE ANNUAL WELLNESS VISIT, INITIAL: Primary | ICD-10-CM

## 2025-05-23 PROCEDURE — 99214 OFFICE O/P EST MOD 30 MIN: CPT | Performed by: NURSE PRACTITIONER

## 2025-05-23 PROCEDURE — G0438 PPPS, INITIAL VISIT: HCPCS | Performed by: NURSE PRACTITIONER

## 2025-05-23 NOTE — PROGRESS NOTES
Name: Chio Raines      : 1960      MRN: [id number]  Encounter Provider: EVANGELINA Hernandez  Encounter Date: 2025   Encounter department: [facility]  :  Assessment & Plan  Medicare annual wellness visit, initial         Benign essential HTN  White coat hypertension, blood pressure is consistently elevated in office. Home blood pressures are 120's-130's/70's.  Continue lisinopril.     Orders:  •  CBC and differential; Future  •  Comprehensive metabolic panel; Future  •  TSH, 3rd generation; Future    Class 3 severe obesity due to excess calories with serious comorbidity and body mass index (BMI) of 45.0 to 49.9 in adult  Weight max was 301 pounds. She got down to 265 pounds through diet and exercise modifications.   Now back to 282, off track a bit. Planning to get back on track.       Orders:  •  CBC and differential; Future  •  Comprehensive metabolic panel; Future  •  TSH, 3rd generation; Future    Primary osteoarthritis of right knee  Right knee osteoarthritis.   Has been working on weight loss for 3-4 years now to be able to have a knee replacement.   In , was told by orthopedics she could do knee replacement if reached BMI goal <45. BMI is 48.23 at this time.   In , was told by same orthopedics, with BMI 45.60, was told she needed to lose another 20-25 pounds to be able to proceed with knee replacement. This completely derailed her, and she got off track with emotions, eating, and swimming.   Is really hoping for knee replacement, she is active, but desires to be more active. The knee is holding her back.       Generalized anxiety disorder  Stable with rare use of alprazolam.   Follows with counselor.        Mild persistent asthma without complication  Flares at times in the spring due to pollen allergy.   Albuterol as needed.       Obstructive sleep apnea  Stable on CPAP. Follows with Dr. Titus.           Other specified hypothyroidism  Continue current dose of  levothyroxine.   Repeat TSH.        Pure hypercholesterolemia  Last lipid panel with , , HDL 58, .   Repeat lipid panel.   Continue lifestyle modifications.     Orders:  •  Lipid panel; Future    Colon cancer screening  Due for cologuard. Agreeable to complete.     Orders:  •  Cologuard    Diabetes mellitus screening    Orders:  •  Hemoglobin A1C; Future        Depression Screening and Follow-up Plan: Patient was screened for depression during today's encounter. They screened negative with a PHQ-2 score of 2.      Urinary Incontinence Plan of Care: counseling topics discussed: use restroom every 2 hours.       Preventive health issues were discussed with patient, and age appropriate screening tests were ordered as noted in patient's After Visit Summary. Personalized health advice and appropriate referrals for health education or preventive services given if needed, as noted in patient's After Visit Summary.    History of Present Illness     Chio Raines  is  a 64 year old female presenting today for annual medicare wellness and follow up on chronic conditions.            Patient Care Team:  EVANGELINA Hernandez as PCP - General (Family Medicine)  [facility] PCP - [facility] ([facility])    Review of Systems   Constitutional: Negative.    HENT: Negative.     Respiratory: Negative.     Cardiovascular: Negative.    Gastrointestinal: Negative.    Genitourinary: Negative.    Musculoskeletal:  Positive for arthralgias (right knee pain).   Skin: Negative.    Neurological: Negative.    Hematological: Negative.    Psychiatric/Behavioral: Negative.       Medical History Reviewed by provider this encounter:  Tobacco  Allergies  Meds  Problems  Med Hx  Surg Hx  Fam Hx       Annual Wellness Visit Questionnaire   Chio is here for her Initial Wellness visit.     Health Risk Assessment:   Patient rates overall health as good. Patient feels that their physical health rating is slightly worse.  Patient is dissatisfied with their life. Eyesight was rated as same. Hearing was rated as same. Patient feels that their emotional and mental health rating is slightly worse. Patients states they are never, rarely angry. Patient states they are sometimes unusually tired/fatigued. Pain experienced in the last 7 days has been some. Patient's pain rating has been 3/10. Patient states that she has experienced no weight loss or gain in last 6 months.     Depression Screening:   PHQ-2 Score: 2      Fall Risk Screening:   In the past year, patient has experienced: no history of falling in past year      Urinary Incontinence Screening:   Patient has leaked urine accidently in the last six months.     Home Safety:  Patient has trouble with stairs inside or outside of their home. Patient has working smoke alarms and has no working carbon monoxide detector. Home safety hazards include: not having non-slip bath and/or shower mats.     Nutrition:   Current diet is Regular and Limited junk food.     Medications:   Patient is not currently taking any over-the-counter supplements. Patient is able to manage medications.     Activities of Daily Living (ADLs)/Instrumental Activities of Daily Living (IADLs):   Walk and transfer into and out of bed and chair?: Yes  Dress and groom yourself?: Yes    Bathe or shower yourself?: Yes    Feed yourself? Yes  Do your laundry/housekeeping?: Yes  Manage your money, pay your bills and track your expenses?: Yes  Make your own meals?: Yes    Do your own shopping?: Yes    Previous Hospitalizations:   Any hospitalizations or ED visits within the last 12 months?: No      Advance Care Planning:   Living will: Yes    Durable POA for healthcare: Yes    Advanced directive: Yes      Cognitive Screening:   Provider or family/friend/caregiver concerned regarding cognition?: No    Preventive Screenings      Cardiovascular Screening:    General: Screening Not Indicated and History Lipid Disorder      Diabetes  Screening:     General: Risks and Benefits Discussed    Due for: Blood Glucose      Colorectal Cancer Screening:     General: Risks and Benefits Discussed    Due for: Cologuard      Breast Cancer Screening:     General: Patient Declines      Cervical Cancer Screening:    General: Screening Not Indicated      Osteoporosis Screening:    General: Patient Declines      Abdominal Aortic Aneurysm (AAA) Screening:        General: Screening Not Indicated      Lung Cancer Screening:     General: Screening Not Indicated      Hepatitis C Screening:    General: Screening Current    Immunizations:  - Immunizations due: Prevnar 20 and Zoster (Shingrix)  - The patient declines recommended vaccines currently despite my recommendations      Screening, Brief Intervention, and Referral to Treatment (SBIRT)     Screening  Typical number of drinks in a day: 0  Typical number of drinks in a week: 5  Interpretation: Low risk drinking behavior.    AUDIT-C Screenin) How often did you have a drink containing alcohol in the past year? 2 to 3 times a week  2) How many drinks did you have on a typical day when you were drinking in the past year? 1 to 2  3) How often did you have 6 or more drinks on one occasion in the past year? never    AUDIT-C Score: 3  Interpretation: Score 3-12 (female): POSITIVE screen for alcohol misuse    AUDIT Screenin) How often during the last year have you found that you were not able to stop drinking once you had started? 0 - never  5) How often during the last year have you failed to do what was normally expected from you because of drinking? 0 - never  6) How often during the last year have you needed a first drink in the morning to get yourself going after a heavy drinking session? 0 - never  7) How often during the last year have you had a feeling of guilt or remorse after drinking? 1 - less than monthly  8) How often during the last year have you been unable to remember what happened the night before  "because you had been drinking? 0 - never  9) Have you or someone else been injured as a result of your drinking? 0 - no  10) Has a relative or friend or a doctor or another health worker been concerned about your drinking or suggested you cut down? 0 - no    AUDIT Score: 4  Interpretation: Low risk alcohol consumption    Single Item Drug Screening:  How often have you used an illegal drug (including marijuana) or a prescription medication for non-medical reasons in the past year? never    Single Item Drug Screen Score: 0  Interpretation: Negative screen for possible drug use disorder    Brief Intervention  Alcohol & drug use screenings were reviewed. No concerns regarding substance use disorder identified.     Social Drivers of Health     Food Insecurity: No Food Insecurity (5/22/2025)    Nursing - Inadequate Food Risk Classification    • Worried About Running Out of Food in the Last Year: Never true    • Ran Out of Food in the Last Year: Never true   Transportation Needs: No Transportation Needs (5/22/2025)    PRAPARE - Transportation    • Lack of Transportation (Medical): No    • Lack of Transportation (Non-Medical): No   Housing Stability: Low Risk  (5/22/2025)    Housing Stability Vital Sign    • Unable to Pay for Housing in the Last Year: No    • Number of Times Moved in the Last Year: 0    • Homeless in the Last Year: No   Utilities: Not At Risk (5/22/2025)    [facility] Utilities    • Threatened with loss of utilities: No     No results found.    Objective   /100   Pulse 74   Temp 97.8 °F (36.6 °C) (Temporal)   Resp 16   Ht 5' 5\" (1.651 m)   Wt 128 kg (282 lb)   SpO2 100%   BMI 46.93 kg/m²     Physical Exam  Vitals and nursing note reviewed.   Constitutional:       General: She is not in acute distress.     Appearance: Normal appearance. She is obese. She is not ill-appearing.   HENT:      Head: Atraumatic.      Right Ear: Tympanic membrane normal.      Left Ear: Tympanic membrane normal.      " Mouth/Throat:      Mouth: Mucous membranes are moist.      Pharynx: Oropharynx is clear.     Eyes:      Conjunctiva/sclera: Conjunctivae normal.      Pupils: Pupils are equal, round, and reactive to light.     Neck:      Thyroid: No thyromegaly.     Cardiovascular:      Rate and Rhythm: Normal rate and regular rhythm.      Heart sounds: No murmur heard.  Pulmonary:      Effort: Pulmonary effort is normal. No respiratory distress.      Breath sounds: Normal breath sounds. No wheezing or rales.     Musculoskeletal:         General: No deformity.      Cervical back: Normal range of motion and neck supple.      Right lower leg: No edema.      Left lower leg: No edema.   Lymphadenopathy:      Cervical: No cervical adenopathy.     Neurological:      Mental Status: She is alert.      Gait: Gait normal.     Psychiatric:         Mood and Affect: Mood normal.       Current Medications[1]          [1]    Current Outpatient Medications:   •  ALPRAZolam (XANAX) 0.5 mg tablet, Take 1 tablet (0.5 mg total) by mouth daily as needed for anxiety, Disp: 30 tablet, Rfl: 0  •  levothyroxine 175 mcg tablet, Take 1 tablet (175 mcg total) by mouth daily in the early morning, Disp: 90 tablet, Rfl: 0  •  Albuterol Sulfate 108 (90 Base) MCG/ACT AEPB, Inhale 1 puff, Disp: , Rfl:   •  Ascorbic Acid 500 MG CAPS, Take 2,000 mg by mouth daily  , Disp: , Rfl:   •  Cholecalciferol (Vitamin D3) 50 MCG (2000 UT) capsule, 4,000 Units  , Disp: , Rfl:   •  EPINEPHrine (EPIPEN) 0.3 mg/0.3 mL SOAJ, Inject 0.3 mL (0.3 mg total) into a muscle once for 1 dose, Disp: 0.6 mL, Rfl: 0  •  lisinopril (ZESTRIL) 10 mg tablet, TAKE 1 TABLET BY MOUTH TWICE A DAY, Disp: 180 tablet, Rfl: 0

## 2025-05-23 NOTE — ASSESSMENT & PLAN NOTE
Orders:  •  CBC and differential; Future  •  Comprehensive metabolic panel; Future  •  TSH, 3rd generation; Future

## 2025-05-23 NOTE — ASSESSMENT & PLAN NOTE
White coat hypertension, blood pressure is consistently elevated in office. Home blood pressures are 120's-130's/70's.  Continue lisinopril.     Orders:  •  CBC and differential; Future  •  Comprehensive metabolic panel; Future  •  TSH, 3rd generation; Future

## 2025-05-23 NOTE — PATIENT INSTRUCTIONS
Medicare Preventive Visit Patient Instructions  Thank you for completing your Welcome to Medicare Visit or Medicare Annual Wellness Visit today. Your next wellness visit will be due in one year (5/24/2026).  The screening/preventive services that you may require over the next 5-10 years are detailed below. Some tests may not apply to you based off risk factors and/or age. Screening tests ordered at today's visit but not completed yet may show as past due. Also, please note that scanned in results may not display below.  Preventive Screenings:  Service Recommendations Previous Testing/Comments   Colorectal Cancer Screening  * Colonoscopy    * Fecal Occult Blood Test (FOBT)/Fecal Immunochemical Test (FIT)  * Fecal DNA/Cologuard Test  * Flexible Sigmoidoscopy Age: 45-75 years old   Colonoscopy: every 10 years (may be performed more frequently if at higher risk)  OR  FOBT/FIT: every 1 year  OR  Cologuard: every 3 years  OR  Sigmoidoscopy: every 5 years  Screening may be recommended earlier than age 45 if at higher risk for colorectal cancer. Also, an individualized decision between you and your healthcare provider will decide whether screening between the ages of 76-85 would be appropriate. Colonoscopy: Not on file  FOBT/FIT: Not on file  Cologuard: Not on file  Sigmoidoscopy: Not on file          Breast Cancer Screening Age: 40+ years old  Frequency: every 1-2 years  Not required if history of left and right mastectomy Mammogram: Not on file        Cervical Cancer Screening Between the ages of 21-29, pap smear recommended once every 3 years.   Between the ages of 30-65, can perform pap smear with HPV co-testing every 5 years.   Recommendations may differ for women with a history of total hysterectomy, cervical cancer, or abnormal pap smears in past. Pap Smear: 11/21/2018        Hepatitis C Screening Once for adults born between 1945 and 1965  More frequently in patients at high risk for Hepatitis C Hep C Antibody:  12/31/2020        Diabetes Screening 1-2 times per year if you're at risk for diabetes or have pre-diabetes Fasting glucose: No results in last 5 years (No results in last 5 years)  A1C: No results in last 5 years (No results in last 5 years)      Cholesterol Screening Once every 5 years if you don't have a lipid disorder. May order more often based on risk factors. Lipid panel: 04/24/2023          Other Preventive Screenings Covered by Medicare:  Abdominal Aortic Aneurysm (AAA) Screening: covered once if your at risk. You're considered to be at risk if you have a family history of AAA.  Lung Cancer Screening: covers low dose CT scan once per year if you meet all of the following conditions: (1) Age 55-77; (2) No signs or symptoms of lung cancer; (3) Current smoker or have quit smoking within the last 15 years; (4) You have a tobacco smoking history of at least 20 pack years (packs per day multiplied by number of years you smoked); (5) You get a written order from a healthcare provider.  Glaucoma Screening: covered annually if you're considered high risk: (1) You have diabetes OR (2) Family history of glaucoma OR (3)  aged 50 and older OR (4)  American aged 65 and older  Osteoporosis Screening: covered every 2 years if you meet one of the following conditions: (1) You're estrogen deficient and at risk for osteoporosis based off medical history and other findings; (2) Have a vertebral abnormality; (3) On glucocorticoid therapy for more than 3 months; (4) Have primary hyperparathyroidism; (5) On osteoporosis medications and need to assess response to drug therapy.   Last bone density test (DXA Scan): Not on file.  HIV Screening: covered annually if you're between the age of 15-65. Also covered annually if you are younger than 15 and older than 65 with risk factors for HIV infection. For pregnant patients, it is covered up to 3 times per pregnancy.    Immunizations:  Immunization Recommendations    Influenza Vaccine Annual influenza vaccination during flu season is recommended for all persons aged >= 6 months who do not have contraindications   Pneumococcal Vaccine   * Pneumococcal conjugate vaccine = PCV13 (Prevnar 13), PCV15 (Vaxneuvance), PCV20 (Prevnar 20)  * Pneumococcal polysaccharide vaccine = PPSV23 (Pneumovax) Adults 19-63 yo with certain risk factors or if 65+ yo  If never received any pneumonia vaccine: recommend Prevnar 20 (PCV20)  Give PCV20 if previously received 1 dose of PCV13 or PPSV23   Hepatitis B Vaccine 3 dose series if at intermediate or high risk (ex: diabetes, end stage renal disease, liver disease)   Respiratory syncytial virus (RSV) Vaccine - COVERED BY MEDICARE PART D  * RSVPreF3 (Arexvy) CDC recommends that adults 60 years of age and older may receive a single dose of RSV vaccine using shared clinical decision-making (SCDM)   Tetanus (Td) Vaccine - COST NOT COVERED BY MEDICARE PART B Following completion of primary series, a booster dose should be given every 10 years to maintain immunity against tetanus. Td may also be given as tetanus wound prophylaxis.   Tdap Vaccine - COST NOT COVERED BY MEDICARE PART B Recommended at least once for all adults. For pregnant patients, recommended with each pregnancy.   Shingles Vaccine (Shingrix) - COST NOT COVERED BY MEDICARE PART B  2 shot series recommended in those 19 years and older who have or will have weakened immune systems or those 50 years and older     Health Maintenance Due:      Topic Date Due   • HIV Screening  Never done   • Breast Cancer Screening: Mammogram  Never done   • Colorectal Cancer Screening  02/08/2024   • Hepatitis C Screening  Completed     Immunizations Due:      Topic Date Due   • Pneumococcal Vaccine: 65+ Years (1 of 2 - PCV) Never done   • COVID-19 Vaccine (8 - 2024-25 season) 12/09/2024     Advance Directives   What are advance directives?  Advance directives are legal documents that state your wishes and  plans for medical care. These plans are made ahead of time in case you lose your ability to make decisions for yourself. Advance directives can apply to any medical decision, such as the treatments you want, and if you want to donate organs.   What are the types of advance directives?  There are many types of advance directives, and each state has rules about how to use them. You may choose a combination of any of the following:  Living will:  This is a written record of the treatment you want. You can also choose which treatments you do not want, which to limit, and which to stop at a certain time. This includes surgery, medicine, IV fluid, and tube feedings.   Durable power of  for healthcare (DPAHC):  This is a written record that states who you want to make healthcare choices for you when you are unable to make them for yourself. This person, called a proxy, is usually a family member or a friend. You may choose more than 1 proxy.  Do not resuscitate (DNR) order:  A DNR order is used in case your heart stops beating or you stop breathing. It is a request not to have certain forms of treatment, such as CPR. A DNR order may be included in other types of advance directives.  Medical directive:  This covers the care that you want if you are in a coma, near death, or unable to make decisions for yourself. You can list the treatments you want for each condition. Treatment may include pain medicine, surgery, blood transfusions, dialysis, IV or tube feedings, and a ventilator (breathing machine).  Values history:  This document has questions about your views, beliefs, and how you feel and think about life. This information can help others choose the care that you would choose.  Why are advance directives important?  An advance directive helps you control your care. Although spoken wishes may be used, it is better to have your wishes written down. Spoken wishes can be misunderstood, or not followed. Treatments  may be given even if you do not want them. An advance directive may make it easier for your family to make difficult choices about your care.   Urinary Incontinence   Urinary incontinence (UI)  is when you lose control of your bladder. UI develops because your bladder cannot store or empty urine properly. The 3 most common types of UI are stress incontinence, urge incontinence, or both.  Medicines:   May be given to help strengthen your bladder control. Report any side effects of medication to your healthcare provider.  Do pelvic muscle exercises often:  Your pelvic muscles help you stop urinating. Squeeze these muscles tight for 5 seconds, then relax for 5 seconds. Gradually work up to squeezing for 10 seconds. Do 3 sets of 15 repetitions a day, or as directed. This will help strengthen your pelvic muscles and improve bladder control.  Train your bladder:  Go to the bathroom at set times, such as every 2 hours, even if you do not feel the urge to go. You can also try to hold your urine when you feel the urge to go. For example, hold your urine for 5 minutes when you feel the urge to go. As that becomes easier, hold your urine for 10 minutes.   Self-care:   Keep a UI record.  Write down how often you leak urine and how much you leak. Make a note of what you were doing when you leaked urine.  Drink liquids as directed. You may need to limit the amount of liquid you drink to help control your urine leakage. Do not drink any liquid right before you go to bed. Limit or do not have drinks that contain caffeine or alcohol.   Prevent constipation.  Eat a variety of high-fiber foods. Good examples are high-fiber cereals, beans, vegetables, and whole-grain breads. Walking is the best way to trigger your intestines to have a bowel movement.  Exercise regularly and maintain a healthy weight.  Weight loss and exercise will decrease pressure on your bladder and help you control your leakage.   Use a catheter as directed  to help  empty your bladder. A catheter is a tiny, plastic tube that is put into your bladder to drain your urine.   Go to behavior therapy as directed.  Behavior therapy may be used to help you learn to control your urge to urinate.    Weight Management   Why it is important to manage your weight:  Being overweight increases your risk of health conditions such as heart disease, high blood pressure, type 2 diabetes, and certain types of cancer. It can also increase your risk for osteoarthritis, sleep apnea, and other respiratory problems. Aim for a slow, steady weight loss. Even a small amount of weight loss can lower your risk of health problems.  How to lose weight safely:  A safe and healthy way to lose weight is to eat fewer calories and get regular exercise. You can lose up about 1 pound a week by decreasing the number of calories you eat by 500 calories each day.   Healthy meal plan for weight management:  A healthy meal plan includes a variety of foods, contains fewer calories, and helps you stay healthy. A healthy meal plan includes the following:  Eat whole-grain foods more often.  A healthy meal plan should contain fiber. Fiber is the part of grains, fruits, and vegetables that is not broken down by your body. Whole-grain foods are healthy and provide extra fiber in your diet. Some examples of whole-grain foods are whole-wheat breads and pastas, oatmeal, brown rice, and bulgur.  Eat a variety of vegetables every day.  Include dark, leafy greens such as spinach, kale, brandon greens, and mustard greens. Eat yellow and orange vegetables such as carrots, sweet potatoes, and winter squash.   Eat a variety of fruits every day.  Choose fresh or canned fruit (canned in its own juice or light syrup) instead of juice. Fruit juice has very little or no fiber.  Eat low-fat dairy foods.  Drink fat-free (skim) milk or 1% milk. Eat fat-free yogurt and low-fat cottage cheese. Try low-fat cheeses such as mozzarella and other  reduced-fat cheeses.  Choose meat and other protein foods that are low in fat.  Choose beans or other legumes such as split peas or lentils. Choose fish, skinless poultry (chicken or turkey), or lean cuts of red meat (beef or pork). Before you cook meat or poultry, cut off any visible fat.   Use less fat and oil.  Try baking foods instead of frying them. Add less fat, such as margarine, sour cream, regular salad dressing and mayonnaise to foods. Eat fewer high-fat foods. Some examples of high-fat foods include french fries, doughnuts, ice cream, and cakes.  Eat fewer sweets.  Limit foods and drinks that are high in sugar. This includes candy, cookies, regular soda, and sweetened drinks.  Exercise:  Exercise at least 30 minutes per day on most days of the week. Some examples of exercise include walking, biking, dancing, and swimming. You can also fit in more physical activity by taking the stairs instead of the elevator or parking farther away from stores. Ask your healthcare provider about the best exercise plan for you.    © Copyright [employer] 2018 Information is for End User's use only and may not be sold, redistributed or otherwise used for commercial purposes. All illustrations and images included in CareNotes® are the copyrighted property of [employer] or [employer]

## 2025-05-23 NOTE — ASSESSMENT & PLAN NOTE
Weight max was 301 pounds. She got down to 265 pounds through diet and exercise modifications.   Now back to 282, off track a bit. Planning to get back on track.       Orders:  •  CBC and differential; Future  •  Comprehensive metabolic panel; Future  •  TSH, 3rd generation; Future

## 2025-05-23 NOTE — ASSESSMENT & PLAN NOTE
Right knee osteoarthritis.   Has been working on weight loss for 3-4 years now to be able to have a knee replacement.   In 2022, was told by orthopedics she could do knee replacement if reached BMI goal <45. BMI is 48.23 at this time.   In 2023, was told by same orthopedics, with BMI 45.60, was told she needed to lose another 20-25 pounds to be able to proceed with knee replacement. This completely derailed her, and she got off track with emotions, eating, and swimming.   Is really hoping for knee replacement, she is active, but desires to be more active. The knee is holding her back.

## 2025-05-30 ENCOUNTER — TELEPHONE (OUTPATIENT)
Dept: FAMILY MEDICINE CLINIC | Facility: CLINIC | Age: 65
End: 2025-05-30

## 2025-05-30 NOTE — TELEPHONE ENCOUNTER
Please contact [facility].      For a second opinion regarding your knee, I recommend seeing:  Dr. Iss[facility] Barrientos. [facility]   [phone] to schedule.

## 2025-06-27 ENCOUNTER — APPOINTMENT (OUTPATIENT)
Dept: LAB | Facility: CLINIC | Age: 65
End: 2025-06-27
Payer: COMMERCIAL

## 2025-06-27 DIAGNOSIS — I10 BENIGN ESSENTIAL HTN: ICD-10-CM

## 2025-06-27 DIAGNOSIS — E66.813 CLASS 3 SEVERE OBESITY DUE TO EXCESS CALORIES WITH SERIOUS COMORBIDITY AND BODY MASS INDEX (BMI) OF 45.0 TO 49.9 IN ADULT: ICD-10-CM

## 2025-06-27 DIAGNOSIS — E78.00 PURE HYPERCHOLESTEROLEMIA: ICD-10-CM

## 2025-06-27 DIAGNOSIS — Z13.1 DIABETES MELLITUS SCREENING: ICD-10-CM

## 2025-06-27 LAB
ALBUMIN SERPL BCG-MCNC: 4.3 G/DL (ref 3.5–5)
ALP SERPL-CCNC: 52 U/L (ref 34–104)
ALT SERPL W P-5'-P-CCNC: 16 U/L (ref 7–52)
ANION GAP SERPL CALCULATED.3IONS-SCNC: 8 MMOL/L (ref 4–13)
AST SERPL W P-5'-P-CCNC: 17 U/L (ref 13–39)
BASOPHILS # BLD AUTO: 0.05 THOUSANDS/ÂΜL (ref 0–0.1)
BASOPHILS NFR BLD AUTO: 1 % (ref 0–1)
BILIRUB SERPL-MCNC: 0.51 MG/DL (ref 0.2–1)
BUN SERPL-MCNC: 14 MG/DL (ref 5–25)
CALCIUM SERPL-MCNC: 9.2 MG/DL (ref 8.4–10.2)
CHLORIDE SERPL-SCNC: 100 MMOL/L (ref 96–108)
CHOLEST SERPL-MCNC: 184 MG/DL (ref ?–200)
CO2 SERPL-SCNC: 29 MMOL/L (ref 21–32)
CREAT SERPL-MCNC: 0.56 MG/DL (ref 0.6–1.3)
EOSINOPHIL # BLD AUTO: 0.19 THOUSAND/ÂΜL (ref 0–0.61)
EOSINOPHIL NFR BLD AUTO: 2 % (ref 0–6)
ERYTHROCYTE [DISTWIDTH] IN BLOOD BY AUTOMATED COUNT: 14.5 % (ref 11.6–15.1)
EST. AVERAGE GLUCOSE BLD GHB EST-MCNC: 88 MG/DL
GFR SERPL CREATININE-BSD FRML MDRD: 98 ML/MIN/1.73SQ M
GLUCOSE P FAST SERPL-MCNC: 95 MG/DL (ref 65–99)
HBA1C MFR BLD: 4.7 %
HCT VFR BLD AUTO: 33.9 % (ref 34.8–46.1)
HDLC SERPL-MCNC: 60 MG/DL
HGB BLD-MCNC: 11.3 G/DL (ref 11.5–15.4)
IMM GRANULOCYTES # BLD AUTO: 0.04 THOUSAND/UL (ref 0–0.2)
IMM GRANULOCYTES NFR BLD AUTO: 1 % (ref 0–2)
LDLC SERPL CALC-MCNC: 107 MG/DL (ref 0–100)
LYMPHOCYTES # BLD AUTO: 1.46 THOUSANDS/ÂΜL (ref 0.6–4.47)
LYMPHOCYTES NFR BLD AUTO: 17 % (ref 14–44)
MCH RBC QN AUTO: 29.1 PG (ref 26.8–34.3)
MCHC RBC AUTO-ENTMCNC: 33.3 G/DL (ref 31.4–37.4)
MCV RBC AUTO: 87 FL (ref 82–98)
MONOCYTES # BLD AUTO: 0.62 THOUSAND/ÂΜL (ref 0.17–1.22)
MONOCYTES NFR BLD AUTO: 7 % (ref 4–12)
NEUTROPHILS # BLD AUTO: 6.43 THOUSANDS/ÂΜL (ref 1.85–7.62)
NEUTS SEG NFR BLD AUTO: 72 % (ref 43–75)
NONHDLC SERPL-MCNC: 124 MG/DL
NRBC BLD AUTO-RTO: 0 /100 WBCS
PLATELET # BLD AUTO: 308 THOUSANDS/UL (ref 149–390)
PMV BLD AUTO: 10.6 FL (ref 8.9–12.7)
POTASSIUM SERPL-SCNC: 4.1 MMOL/L (ref 3.5–5.3)
PROT SERPL-MCNC: 7.2 G/DL (ref 6.4–8.4)
RBC # BLD AUTO: 3.88 MILLION/UL (ref 3.81–5.12)
SODIUM SERPL-SCNC: 137 MMOL/L (ref 135–147)
TRIGL SERPL-MCNC: 86 MG/DL (ref ?–150)
TSH SERPL DL<=0.05 MIU/L-ACNC: 0.99 UIU/ML (ref 0.45–4.5)
WBC # BLD AUTO: 8.79 THOUSAND/UL (ref 4.31–10.16)

## 2025-06-27 PROCEDURE — 85025 COMPLETE CBC W/AUTO DIFF WBC: CPT

## 2025-06-27 PROCEDURE — 36415 COLL VENOUS BLD VENIPUNCTURE: CPT

## 2025-06-27 PROCEDURE — 80061 LIPID PANEL: CPT

## 2025-06-27 PROCEDURE — 84443 ASSAY THYROID STIM HORMONE: CPT

## 2025-06-27 PROCEDURE — 83036 HEMOGLOBIN GLYCOSYLATED A1C: CPT

## 2025-06-27 PROCEDURE — 80053 COMPREHEN METABOLIC PANEL: CPT

## 2025-06-29 ENCOUNTER — RESULTS FOLLOW-UP (OUTPATIENT)
Dept: FAMILY MEDICINE CLINIC | Facility: CLINIC | Age: 65
End: 2025-06-29

## 2025-06-30 DIAGNOSIS — F41.1 GENERALIZED ANXIETY DISORDER: ICD-10-CM

## 2025-06-30 DIAGNOSIS — E03.8 OTHER SPECIFIED HYPOTHYROIDISM: ICD-10-CM

## 2025-06-30 RX ORDER — ALPRAZOLAM 0.5 MG
0.5 TABLET ORAL DAILY PRN
Qty: 30 TABLET | Refills: 0 | Status: SHIPPED | OUTPATIENT
Start: 2025-06-30

## 2025-06-30 RX ORDER — LEVOTHYROXINE SODIUM 175 UG/1
175 TABLET ORAL
Qty: 90 TABLET | Refills: 3 | Status: SHIPPED | OUTPATIENT
Start: 2025-06-30

## 2025-06-30 NOTE — TELEPHONE ENCOUNTER
----- Message from EVANGELINA Moody sent at 6/29/2025  2:25 PM EDT -----  Stable blood work.   ----- Message -----  From: Lab, Background User  Sent: 6/27/2025   6:30 PM EDT  To: EVANGELINA Hernandez

## 2025-07-14 DIAGNOSIS — Z91.030 BEE STING ALLERGY: ICD-10-CM

## 2025-07-15 RX ORDER — EPINEPHRINE 0.3 MG/.3ML
0.3 INJECTION SUBCUTANEOUS ONCE
Qty: 0.6 ML | Refills: 1 | Status: SHIPPED | OUTPATIENT
Start: 2025-07-15 | End: 2025-07-15

## 2025-08-11 ENCOUNTER — PATIENT MESSAGE (OUTPATIENT)
Dept: FAMILY MEDICINE CLINIC | Facility: CLINIC | Age: 65
End: 2025-08-11